# Patient Record
Sex: MALE | Race: WHITE | NOT HISPANIC OR LATINO
[De-identification: names, ages, dates, MRNs, and addresses within clinical notes are randomized per-mention and may not be internally consistent; named-entity substitution may affect disease eponyms.]

---

## 2017-04-03 ENCOUNTER — APPOINTMENT (OUTPATIENT)
Dept: FAMILY MEDICINE | Facility: CLINIC | Age: 62
End: 2017-04-03

## 2017-04-03 VITALS
HEART RATE: 55 BPM | RESPIRATION RATE: 12 BRPM | HEIGHT: 69 IN | DIASTOLIC BLOOD PRESSURE: 74 MMHG | WEIGHT: 232 LBS | BODY MASS INDEX: 34.36 KG/M2 | SYSTOLIC BLOOD PRESSURE: 126 MMHG | OXYGEN SATURATION: 97 %

## 2017-04-10 ENCOUNTER — APPOINTMENT (OUTPATIENT)
Dept: FAMILY MEDICINE | Facility: CLINIC | Age: 62
End: 2017-04-10

## 2017-04-10 VITALS
HEART RATE: 54 BPM | DIASTOLIC BLOOD PRESSURE: 72 MMHG | TEMPERATURE: 98.5 F | HEIGHT: 69 IN | RESPIRATION RATE: 12 BRPM | BODY MASS INDEX: 34.51 KG/M2 | OXYGEN SATURATION: 98 % | SYSTOLIC BLOOD PRESSURE: 120 MMHG | WEIGHT: 233 LBS

## 2017-07-05 ENCOUNTER — APPOINTMENT (OUTPATIENT)
Dept: FAMILY MEDICINE | Facility: CLINIC | Age: 62
End: 2017-07-05

## 2017-07-05 VITALS
DIASTOLIC BLOOD PRESSURE: 76 MMHG | SYSTOLIC BLOOD PRESSURE: 142 MMHG | HEART RATE: 62 BPM | HEIGHT: 69 IN | WEIGHT: 228 LBS | RESPIRATION RATE: 12 BRPM | TEMPERATURE: 98.3 F | OXYGEN SATURATION: 98 % | BODY MASS INDEX: 33.77 KG/M2

## 2017-07-05 RX ORDER — DEXAMETHASONE SODIUM PHOSPHATE 4 MG/ML
4 INJECTION, SOLUTION INTRAMUSCULAR; INTRAVENOUS
Qty: 0 | Refills: 0 | Status: COMPLETED | OUTPATIENT
Start: 2017-07-05

## 2017-07-05 RX ADMIN — Medication 1 MG/ML: at 00:00

## 2017-10-16 ENCOUNTER — APPOINTMENT (OUTPATIENT)
Dept: FAMILY MEDICINE | Facility: CLINIC | Age: 62
End: 2017-10-16
Payer: MEDICAID

## 2017-10-16 VITALS
HEIGHT: 69 IN | SYSTOLIC BLOOD PRESSURE: 132 MMHG | DIASTOLIC BLOOD PRESSURE: 80 MMHG | HEART RATE: 79 BPM | TEMPERATURE: 97.8 F | BODY MASS INDEX: 33.77 KG/M2 | OXYGEN SATURATION: 98 % | RESPIRATION RATE: 12 BRPM | WEIGHT: 228 LBS

## 2017-10-16 PROCEDURE — 20610 DRAIN/INJ JOINT/BURSA W/O US: CPT | Mod: RT

## 2017-10-16 PROCEDURE — 99214 OFFICE O/P EST MOD 30 MIN: CPT | Mod: 25

## 2017-10-16 RX ORDER — MELOXICAM 15 MG/1
15 TABLET ORAL
Qty: 30 | Refills: 0 | Status: COMPLETED | COMMUNITY
Start: 2017-04-17

## 2017-10-16 RX ORDER — BUPIVACAINE HCL/PF 5 MG/ML
0.5 VIAL (ML) INJECTION
Qty: 0 | Refills: 0 | Status: COMPLETED | OUTPATIENT
Start: 2017-10-16

## 2017-10-16 RX ORDER — OXYCODONE AND ACETAMINOPHEN 5; 325 MG/1; MG/1
5-325 TABLET ORAL
Qty: 5 | Refills: 0 | Status: COMPLETED | COMMUNITY
Start: 2017-09-01

## 2017-10-16 RX ORDER — IBUPROFEN 400 MG/1
400 TABLET, FILM COATED ORAL
Qty: 30 | Refills: 0 | Status: COMPLETED | COMMUNITY
Start: 2017-09-01

## 2017-10-16 RX ORDER — DICLOFENAC SODIUM 10 MG/G
1 GEL TOPICAL
Qty: 100 | Refills: 0 | Status: COMPLETED | COMMUNITY
Start: 2017-05-19

## 2017-10-16 RX ORDER — AMOXICILLIN 500 MG/1
500 CAPSULE ORAL
Qty: 21 | Refills: 0 | Status: COMPLETED | COMMUNITY
Start: 2017-06-14

## 2017-10-16 RX ORDER — CHLORHEXIDINE GLUCONATE, 0.12% ORAL RINSE 1.2 MG/ML
0.12 SOLUTION DENTAL
Qty: 473 | Refills: 0 | Status: COMPLETED | COMMUNITY
Start: 2017-06-14

## 2017-10-16 RX ORDER — FLUTICASONE PROPIONATE 50 UG/1
50 SPRAY, METERED NASAL
Qty: 48 | Refills: 0 | Status: COMPLETED | COMMUNITY
Start: 2017-02-02

## 2017-10-16 RX ORDER — ACETAMINOPHEN AND CODEINE 300; 30 MG/1; MG/1
300-30 TABLET ORAL
Qty: 18 | Refills: 0 | Status: COMPLETED | COMMUNITY
Start: 2017-06-14

## 2017-10-16 RX ORDER — BETAMETHA AC,SOD PHOS/WATER/PF 6 MG/ML
6 (3-3) VIAL (ML) INJECTION
Qty: 1 | Refills: 0 | Status: COMPLETED | OUTPATIENT
Start: 2017-10-16

## 2017-10-16 RX ADMIN — BUPIVACAINE HYDROCHLORIDE %: 5 INJECTION, SOLUTION PERINEURAL at 00:00

## 2017-10-16 RX ADMIN — BETAMETHASONE ACETATE AND BETAMETHASONE SODIUM PHOSPHATE 1 MG/ML: 3; 3 INJECTION, SUSPENSION INTRA-ARTICULAR; INTRALESIONAL; INTRAMUSCULAR; SOFT TISSUE at 00:00

## 2017-12-11 ENCOUNTER — APPOINTMENT (OUTPATIENT)
Dept: ORTHOPEDIC SURGERY | Facility: CLINIC | Age: 62
End: 2017-12-11
Payer: MEDICARE

## 2017-12-11 VITALS
WEIGHT: 228 LBS | BODY MASS INDEX: 33.77 KG/M2 | SYSTOLIC BLOOD PRESSURE: 131 MMHG | DIASTOLIC BLOOD PRESSURE: 83 MMHG | HEART RATE: 61 BPM | HEIGHT: 69 IN

## 2017-12-11 PROCEDURE — 99203 OFFICE O/P NEW LOW 30 MIN: CPT

## 2017-12-22 ENCOUNTER — OUTPATIENT (OUTPATIENT)
Dept: OUTPATIENT SERVICES | Facility: HOSPITAL | Age: 62
LOS: 1 days | End: 2017-12-22
Payer: MEDICARE

## 2017-12-22 VITALS — WEIGHT: 231.04 LBS | HEIGHT: 67.5 IN

## 2017-12-22 DIAGNOSIS — M75.121 COMPLETE ROTATOR CUFF TEAR OR RUPTURE OF RIGHT SHOULDER, NOT SPECIFIED AS TRAUMATIC: ICD-10-CM

## 2017-12-22 DIAGNOSIS — J38.1 POLYP OF VOCAL CORD AND LARYNX: Chronic | ICD-10-CM

## 2017-12-22 DIAGNOSIS — Z98.890 OTHER SPECIFIED POSTPROCEDURAL STATES: Chronic | ICD-10-CM

## 2017-12-22 DIAGNOSIS — G47.30 SLEEP APNEA, UNSPECIFIED: ICD-10-CM

## 2017-12-22 DIAGNOSIS — R94.31 ABNORMAL ELECTROCARDIOGRAM [ECG] [EKG]: ICD-10-CM

## 2017-12-22 DIAGNOSIS — M75.100 UNSPECIFIED ROTATOR CUFF TEAR OR RUPTURE OF UNSPECIFIED SHOULDER, NOT SPECIFIED AS TRAUMATIC: ICD-10-CM

## 2017-12-22 DIAGNOSIS — Z96.652 PRESENCE OF LEFT ARTIFICIAL KNEE JOINT: Chronic | ICD-10-CM

## 2017-12-22 LAB
BUN SERPL-MCNC: 17 MG/DL — SIGNIFICANT CHANGE UP (ref 7–23)
CALCIUM SERPL-MCNC: 8.8 MG/DL — SIGNIFICANT CHANGE UP (ref 8.4–10.5)
CHLORIDE SERPL-SCNC: 105 MMOL/L — SIGNIFICANT CHANGE UP (ref 98–107)
CO2 SERPL-SCNC: 23 MMOL/L — SIGNIFICANT CHANGE UP (ref 22–31)
CREAT SERPL-MCNC: 0.94 MG/DL — SIGNIFICANT CHANGE UP (ref 0.5–1.3)
GLUCOSE SERPL-MCNC: 98 MG/DL — SIGNIFICANT CHANGE UP (ref 70–99)
HCT VFR BLD CALC: 47.4 % — SIGNIFICANT CHANGE UP (ref 39–50)
HGB BLD-MCNC: 15.7 G/DL — SIGNIFICANT CHANGE UP (ref 13–17)
MCHC RBC-ENTMCNC: 31 PG — SIGNIFICANT CHANGE UP (ref 27–34)
MCHC RBC-ENTMCNC: 33.1 % — SIGNIFICANT CHANGE UP (ref 32–36)
MCV RBC AUTO: 93.7 FL — SIGNIFICANT CHANGE UP (ref 80–100)
NRBC # FLD: 0 — SIGNIFICANT CHANGE UP
PLATELET # BLD AUTO: 266 K/UL — SIGNIFICANT CHANGE UP (ref 150–400)
PMV BLD: 10.4 FL — SIGNIFICANT CHANGE UP (ref 7–13)
POTASSIUM SERPL-MCNC: 4.3 MMOL/L — SIGNIFICANT CHANGE UP (ref 3.5–5.3)
POTASSIUM SERPL-SCNC: 4.3 MMOL/L — SIGNIFICANT CHANGE UP (ref 3.5–5.3)
RBC # BLD: 5.06 M/UL — SIGNIFICANT CHANGE UP (ref 4.2–5.8)
RBC # FLD: 12.6 % — SIGNIFICANT CHANGE UP (ref 10.3–14.5)
SODIUM SERPL-SCNC: 144 MMOL/L — SIGNIFICANT CHANGE UP (ref 135–145)
WBC # BLD: 9.46 K/UL — SIGNIFICANT CHANGE UP (ref 3.8–10.5)
WBC # FLD AUTO: 9.46 K/UL — SIGNIFICANT CHANGE UP (ref 3.8–10.5)

## 2017-12-22 PROCEDURE — 93010 ELECTROCARDIOGRAM REPORT: CPT

## 2017-12-22 NOTE — H&P PST ADULT - DENTITION
denies loose teeth, one missing teeth, permanent bridge upper and lower denies loose teeth, one missing teeth, permanent bridge upper and lower/normal

## 2017-12-22 NOTE — H&P PST ADULT - PMH
GERD (gastroesophageal reflux disease)    HTN (hypertension)    Hyperlipidemia    Inguinal hernia    Laryngeal polyp    Obesity (BMI 30-39.9)    Osteoarthritis    Sleep apnea  had sleep studies 3 yrs ago - does not use CPAP machine

## 2017-12-22 NOTE — H&P PST ADULT - NEGATIVE GENERAL GENITOURINARY SYMPTOMS
no bladder infections/no urinary hesitancy/no dysuria/no incontinence/no nocturia/normal urinary frequency/no hematuria

## 2017-12-22 NOTE — H&P PST ADULT - PROBLEM SELECTOR PLAN 2
Pt under the care of Cardiologist. Pt going for Cardiac clearance - will obtain copy.  Pt had echo and stress test in 2017 - will obtain copy.  Will also obtain comparison EKG.

## 2017-12-22 NOTE — H&P PST ADULT - PROBLEM SELECTOR PLAN 1
Scheduled for right shoulder arthroscopy, rotator cuff repair, biceps tenodesis on 1/2/17.  Labs pending,   EKG in chart,  Preop instructions provided to pt  Chlorhexidine scrubs provided to pt with instructions.

## 2017-12-22 NOTE — H&P PST ADULT - HISTORY OF PRESENT ILLNESS
62 yrs old male with h/o right shoulder pain for > 2 months which has been getting progressively worse  and so had X rays and MRI and rotator cuff tear was noted . pt presents for preop eval to have 62 yrs old male with h/o right shoulder pain for > 2 months which has been getting progressively worse  and so had X rays and MRI and rotator cuff tear was noted . pt presents for preop eval to have right shoulder arthroscopy, rotator cuff repair, biceps tenodesis on 1/2/17.

## 2017-12-22 NOTE — H&P PST ADULT - NEGATIVE ENMT SYMPTOMS
no hearing difficulty/no post-nasal discharge/no nose bleeds/no dry mouth/no ear pain/no vertigo/no gum bleeding/no recurrent cold sores/no tinnitus

## 2017-12-22 NOTE — H&P PST ADULT - ASSESSMENT
62 yrs old male with h/o right shoulder pain for > 2 months which has been getting progressively worse , pt presents for preop eval to have right shoulder arthroscopy, rotator cuff repair, biceps tenodesis on 1/2/17.

## 2017-12-22 NOTE — H&P PST ADULT - PSH
H/O arthroscopy of left knee  multiple - over the yrs  H/O bilateral inguinal hernia repair  many yrs ago  H/O total knee replacement, left  2010  Laryngeal polyp  removed 25 yrs ago

## 2018-01-02 ENCOUNTER — TRANSCRIPTION ENCOUNTER (OUTPATIENT)
Age: 63
End: 2018-01-02

## 2018-01-02 ENCOUNTER — OUTPATIENT (OUTPATIENT)
Dept: OUTPATIENT SERVICES | Facility: HOSPITAL | Age: 63
LOS: 1 days | Discharge: HOME | End: 2018-01-02
Payer: MEDICARE

## 2018-01-02 ENCOUNTER — APPOINTMENT (OUTPATIENT)
Dept: ORTHOPEDIC SURGERY | Facility: AMBULATORY SURGERY CENTER | Age: 63
End: 2018-01-02

## 2018-01-02 VITALS
RESPIRATION RATE: 16 BRPM | SYSTOLIC BLOOD PRESSURE: 157 MMHG | HEIGHT: 67.5 IN | HEART RATE: 48 BPM | DIASTOLIC BLOOD PRESSURE: 78 MMHG | WEIGHT: 231.04 LBS | TEMPERATURE: 97 F | OXYGEN SATURATION: 99 %

## 2018-01-02 VITALS
RESPIRATION RATE: 18 BRPM | HEART RATE: 65 BPM | OXYGEN SATURATION: 98 % | SYSTOLIC BLOOD PRESSURE: 117 MMHG | DIASTOLIC BLOOD PRESSURE: 61 MMHG

## 2018-01-02 DIAGNOSIS — Z98.890 OTHER SPECIFIED POSTPROCEDURAL STATES: Chronic | ICD-10-CM

## 2018-01-02 DIAGNOSIS — M75.121 COMPLETE ROTATOR CUFF TEAR OR RUPTURE OF RIGHT SHOULDER, NOT SPECIFIED AS TRAUMATIC: ICD-10-CM

## 2018-01-02 DIAGNOSIS — Z96.652 PRESENCE OF LEFT ARTIFICIAL KNEE JOINT: Chronic | ICD-10-CM

## 2018-01-02 DIAGNOSIS — J38.1 POLYP OF VOCAL CORD AND LARYNX: Chronic | ICD-10-CM

## 2018-01-02 PROCEDURE — 23430 REPAIR BICEPS TENDON: CPT | Mod: RT

## 2018-01-02 PROCEDURE — 29827 SHO ARTHRS SRG RT8TR CUF RPR: CPT | Mod: RT

## 2018-01-02 PROCEDURE — 29826 SHO ARTHRS SRG DECOMPRESSION: CPT | Mod: RT

## 2018-01-02 NOTE — ASU DISCHARGE PLAN (ADULT/PEDIATRIC). - ACTIVITY LEVEL
no heavy lifting/no sports/gym/use sling as directed no heavy lifting/no sports/gym/no weight bearing/no tub baths/use sling as directed/elevate extremity

## 2018-01-02 NOTE — ASU DISCHARGE PLAN (ADULT/PEDIATRIC). - SPECIAL INSTRUCTIONS
see Dr. Ochoa's preprinted discharge instruction sheet see Dr. Ochoa's preprinted discharge instruction sheet.... Apply ice for 20 minutes several times per day for the next 24-48 hours, then as needed for comfort. Narcotic pain medication may cause nausea or constipation. Take medication with food. Increase fluids and fiber intake. No creams, lotions, powders  or ointments to incision site. DO NOT TAKE ADDITIONAL TYLENOL WHILE TAKING PRESCRIPTION PAIN MEDS THEY CONTAIN TYLENOL....

## 2018-01-02 NOTE — BRIEF OPERATIVE NOTE - PROCEDURE
<<-----Click on this checkbox to enter Procedure Arthroscopic repair of right rotator cuff  01/02/2018  R shoulder arthrscopy, high grade partial supraspinatous repair, subscap repair, open subpec bicep tenodesis,  Active  SSTEIN2

## 2018-01-02 NOTE — ASU DISCHARGE PLAN (ADULT/PEDIATRIC). - NOTIFY
Swelling that continues/Persistent Nausea and Vomiting/Fever greater than 101/Bleeding that does not stop/Pain not relieved by Medications/Numbness, color, or temperature change to extremity Persistent Nausea and Vomiting/Pain not relieved by Medications/Fever greater than 101/Increased Irritability or Sluggishness/Inability to Tolerate Liquids or Foods/Bleeding that does not stop/Swelling that continues/Unable to Urinate/Numbness, color, or temperature change to extremity

## 2018-01-12 ENCOUNTER — APPOINTMENT (OUTPATIENT)
Dept: ORTHOPEDIC SURGERY | Facility: CLINIC | Age: 63
End: 2018-01-12
Payer: MEDICARE

## 2018-01-12 PROCEDURE — 99024 POSTOP FOLLOW-UP VISIT: CPT

## 2018-01-30 ENCOUNTER — TRANSCRIPTION ENCOUNTER (OUTPATIENT)
Age: 63
End: 2018-01-30

## 2018-02-07 ENCOUNTER — APPOINTMENT (OUTPATIENT)
Dept: ORTHOPEDIC SURGERY | Facility: CLINIC | Age: 63
End: 2018-02-07
Payer: MEDICARE

## 2018-02-07 PROCEDURE — 99024 POSTOP FOLLOW-UP VISIT: CPT

## 2018-02-07 PROCEDURE — 73030 X-RAY EXAM OF SHOULDER: CPT | Mod: RT

## 2018-03-22 ENCOUNTER — APPOINTMENT (OUTPATIENT)
Dept: ORTHOPEDIC SURGERY | Facility: CLINIC | Age: 63
End: 2018-03-22
Payer: MEDICARE

## 2018-03-22 PROCEDURE — 99024 POSTOP FOLLOW-UP VISIT: CPT

## 2018-05-07 ENCOUNTER — APPOINTMENT (OUTPATIENT)
Dept: ORTHOPEDIC SURGERY | Facility: CLINIC | Age: 63
End: 2018-05-07
Payer: MEDICARE

## 2018-05-07 PROCEDURE — 99213 OFFICE O/P EST LOW 20 MIN: CPT

## 2018-09-25 ENCOUNTER — APPOINTMENT (OUTPATIENT)
Dept: FAMILY MEDICINE | Facility: CLINIC | Age: 63
End: 2018-09-25
Payer: MEDICARE

## 2018-09-25 VITALS
RESPIRATION RATE: 13 BRPM | OXYGEN SATURATION: 98 % | HEART RATE: 71 BPM | WEIGHT: 230 LBS | HEIGHT: 69 IN | DIASTOLIC BLOOD PRESSURE: 80 MMHG | BODY MASS INDEX: 34.07 KG/M2 | SYSTOLIC BLOOD PRESSURE: 122 MMHG | TEMPERATURE: 98.2 F

## 2018-09-25 DIAGNOSIS — M54.2 CERVICALGIA: ICD-10-CM

## 2018-09-25 DIAGNOSIS — Z87.898 PERSONAL HISTORY OF OTHER SPECIFIED CONDITIONS: ICD-10-CM

## 2018-09-25 DIAGNOSIS — M43.6 TORTICOLLIS: ICD-10-CM

## 2018-09-25 DIAGNOSIS — Z86.39 PERSONAL HISTORY OF OTHER ENDOCRINE, NUTRITIONAL AND METABOLIC DISEASE: ICD-10-CM

## 2018-09-25 DIAGNOSIS — Z86.79 PERSONAL HISTORY OF OTHER DISEASES OF THE CIRCULATORY SYSTEM: ICD-10-CM

## 2018-09-25 PROBLEM — K21.9 GASTRO-ESOPHAGEAL REFLUX DISEASE WITHOUT ESOPHAGITIS: Chronic | Status: ACTIVE | Noted: 2017-12-22

## 2018-09-25 PROBLEM — I10 ESSENTIAL (PRIMARY) HYPERTENSION: Chronic | Status: ACTIVE | Noted: 2017-12-22

## 2018-09-25 PROBLEM — J38.1 POLYP OF VOCAL CORD AND LARYNX: Chronic | Status: ACTIVE | Noted: 2017-12-22

## 2018-09-25 PROBLEM — E78.5 HYPERLIPIDEMIA, UNSPECIFIED: Chronic | Status: ACTIVE | Noted: 2017-12-22

## 2018-09-25 PROBLEM — K40.90 UNILATERAL INGUINAL HERNIA, WITHOUT OBSTRUCTION OR GANGRENE, NOT SPECIFIED AS RECURRENT: Chronic | Status: ACTIVE | Noted: 2017-12-22

## 2018-09-25 PROBLEM — M19.90 UNSPECIFIED OSTEOARTHRITIS, UNSPECIFIED SITE: Chronic | Status: ACTIVE | Noted: 2017-12-22

## 2018-09-25 PROBLEM — E66.9 OBESITY, UNSPECIFIED: Chronic | Status: ACTIVE | Noted: 2017-12-22

## 2018-09-25 PROCEDURE — 99214 OFFICE O/P EST MOD 30 MIN: CPT

## 2018-09-25 NOTE — REVIEW OF SYSTEMS
[Patient Intake Form Reviewed] : Patient intake form was reviewed [Fatigue] : fatigue [Negative] : Heme/Lymph [FreeTextEntry2] : Overweight

## 2018-09-25 NOTE — ASSESSMENT
[FreeTextEntry1] : Cervicalgia/Torticollis - Duexis 800/26.6 mg + Tylenol 325 mg. TID with food.  PT Referral.

## 2018-12-26 ENCOUNTER — APPOINTMENT (OUTPATIENT)
Dept: FAMILY MEDICINE | Facility: CLINIC | Age: 63
End: 2018-12-26
Payer: MEDICARE

## 2018-12-26 VITALS
DIASTOLIC BLOOD PRESSURE: 76 MMHG | TEMPERATURE: 98.7 F | SYSTOLIC BLOOD PRESSURE: 130 MMHG | RESPIRATION RATE: 12 BRPM | HEART RATE: 47 BPM | OXYGEN SATURATION: 95 % | WEIGHT: 230 LBS | BODY MASS INDEX: 33.97 KG/M2

## 2018-12-26 DIAGNOSIS — Z86.39 PERSONAL HISTORY OF OTHER ENDOCRINE, NUTRITIONAL AND METABOLIC DISEASE: ICD-10-CM

## 2018-12-26 PROCEDURE — 99214 OFFICE O/P EST MOD 30 MIN: CPT

## 2018-12-26 NOTE — REVIEW OF SYSTEMS
[Fatigue] : fatigue [Nasal Discharge] : nasal discharge [Sore Throat] : sore throat [Cough] : cough [Muscle Pain] : muscle pain [Negative] : Heme/Lymph

## 2018-12-26 NOTE — HISTORY OF PRESENT ILLNESS
[FreeTextEntry8] : Patient presents with productive cough , congestion and head ache x few days.\par Diet is good and drinking water daily. Compliant with medications

## 2018-12-26 NOTE — PLAN
[FreeTextEntry1] : Advised to continue medications as directed. Life style and diet modifications were reviewed.\par Will start antibiotic therapy. Supportive care was discussed

## 2018-12-26 NOTE — COUNSELING
[Weight management counseling provided] : Weight management [Healthy eating counseling provided] : healthy eating [Behavioral health counseling provided] : behavioral health  [None] : None

## 2018-12-27 RX ORDER — AMOXICILLIN AND CLAVULANATE POTASSIUM 875; 125 MG/1; MG/1
875-125 TABLET, COATED ORAL
Qty: 20 | Refills: 0 | Status: DISCONTINUED | COMMUNITY
Start: 2018-12-26 | End: 2018-12-27

## 2019-01-07 ENCOUNTER — APPOINTMENT (OUTPATIENT)
Dept: FAMILY MEDICINE | Facility: CLINIC | Age: 64
End: 2019-01-07
Payer: MEDICARE

## 2019-01-07 VITALS
DIASTOLIC BLOOD PRESSURE: 74 MMHG | RESPIRATION RATE: 12 BRPM | OXYGEN SATURATION: 98 % | SYSTOLIC BLOOD PRESSURE: 128 MMHG | BODY MASS INDEX: 34.07 KG/M2 | TEMPERATURE: 98 F | HEART RATE: 55 BPM | WEIGHT: 230 LBS | HEIGHT: 69 IN

## 2019-01-07 PROCEDURE — 99213 OFFICE O/P EST LOW 20 MIN: CPT

## 2019-01-07 RX ORDER — METHYLPREDNISOLONE 4 MG/1
4 TABLET ORAL
Qty: 1 | Refills: 0 | Status: COMPLETED | COMMUNITY
Start: 2018-12-26 | End: 2019-01-07

## 2019-02-25 ENCOUNTER — LABORATORY RESULT (OUTPATIENT)
Age: 64
End: 2019-02-25

## 2019-02-25 ENCOUNTER — APPOINTMENT (OUTPATIENT)
Dept: FAMILY MEDICINE | Facility: CLINIC | Age: 64
End: 2019-02-25
Payer: MEDICARE

## 2019-02-25 VITALS
BODY MASS INDEX: 34.07 KG/M2 | DIASTOLIC BLOOD PRESSURE: 72 MMHG | HEART RATE: 80 BPM | WEIGHT: 230 LBS | HEIGHT: 69 IN | RESPIRATION RATE: 13 BRPM | SYSTOLIC BLOOD PRESSURE: 126 MMHG | OXYGEN SATURATION: 98 %

## 2019-02-25 DIAGNOSIS — S43.81XD SPRAIN OF OTHER SPECIFIED PARTS OF RIGHT SHOULDER GIRDLE, SUBSEQUENT ENCOUNTER: ICD-10-CM

## 2019-02-25 DIAGNOSIS — J06.9 ACUTE UPPER RESPIRATORY INFECTION, UNSPECIFIED: ICD-10-CM

## 2019-02-25 DIAGNOSIS — Z87.828 PERSONAL HISTORY OF OTHER (HEALED) PHYSICAL INJURY AND TRAUMA: ICD-10-CM

## 2019-02-25 DIAGNOSIS — Z87.898 PERSONAL HISTORY OF OTHER SPECIFIED CONDITIONS: ICD-10-CM

## 2019-02-25 DIAGNOSIS — L25.5 UNSPECIFIED CONTACT DERMATITIS DUE TO PLANTS, EXCEPT FOOD: ICD-10-CM

## 2019-02-25 DIAGNOSIS — M79.672 PAIN IN LEFT FOOT: ICD-10-CM

## 2019-02-25 DIAGNOSIS — Z87.820 PERSONAL HISTORY OF TRAUMATIC BRAIN INJURY: ICD-10-CM

## 2019-02-25 DIAGNOSIS — M75.81 OTHER SHOULDER LESIONS, RIGHT SHOULDER: ICD-10-CM

## 2019-02-25 DIAGNOSIS — M67.911 UNSPECIFIED DISORDER OF SYNOVIUM AND TENDON, RIGHT SHOULDER: ICD-10-CM

## 2019-02-25 DIAGNOSIS — Z87.09 PERSONAL HISTORY OF OTHER DISEASES OF THE RESPIRATORY SYSTEM: ICD-10-CM

## 2019-02-25 DIAGNOSIS — S46.119A STRAIN OF MUSCLE, FASCIA AND TENDON OF LONG HEAD OF BICEPS, UNSPECIFIED ARM, INITIAL ENCOUNTER: ICD-10-CM

## 2019-02-25 DIAGNOSIS — M77.32 CALCANEAL SPUR, LEFT FOOT: ICD-10-CM

## 2019-02-25 PROCEDURE — 36415 COLL VENOUS BLD VENIPUNCTURE: CPT

## 2019-02-25 PROCEDURE — 99215 OFFICE O/P EST HI 40 MIN: CPT | Mod: 25

## 2019-02-25 PROCEDURE — 93000 ELECTROCARDIOGRAM COMPLETE: CPT | Mod: 59

## 2019-02-25 RX ORDER — OSELTAMIVIR PHOSPHATE 75 MG/1
75 CAPSULE ORAL TWICE DAILY
Qty: 10 | Refills: 1 | Status: COMPLETED | COMMUNITY
Start: 2019-01-07 | End: 2019-02-25

## 2019-02-25 RX ORDER — AZITHROMYCIN 250 MG/1
250 TABLET, FILM COATED ORAL
Qty: 1 | Refills: 1 | Status: COMPLETED | COMMUNITY
Start: 2018-12-27 | End: 2019-02-25

## 2019-02-25 RX ORDER — PROMETHAZINE HYDROCHLORIDE AND DEXTROMETHORPHAN HYDROBROMIDE ORAL SOLUTION 15; 6.25 MG/5ML; MG/5ML
6.25-15 SOLUTION ORAL
Qty: 240 | Refills: 1 | Status: COMPLETED | COMMUNITY
Start: 2019-01-07 | End: 2019-02-25

## 2019-02-26 LAB
ALBUMIN SERPL ELPH-MCNC: 4.5 G/DL
ALP BLD-CCNC: 82 U/L
ALT SERPL-CCNC: 20 U/L
ANION GAP SERPL CALC-SCNC: 19 MMOL/L
APPEARANCE: ABNORMAL
APTT BLD: 36.4 SEC
AST SERPL-CCNC: 19 U/L
BASOPHILS # BLD AUTO: 0.08 K/UL
BASOPHILS NFR BLD AUTO: 0.7 %
BILIRUB SERPL-MCNC: 0.5 MG/DL
BILIRUBIN URINE: NEGATIVE
BLOOD URINE: NEGATIVE
BUN SERPL-MCNC: 16 MG/DL
CALCIUM SERPL-MCNC: 9.1 MG/DL
CHLORIDE SERPL-SCNC: 103 MMOL/L
CO2 SERPL-SCNC: 20 MMOL/L
COLOR: YELLOW
CREAT SERPL-MCNC: 0.89 MG/DL
EOSINOPHIL # BLD AUTO: 0.17 K/UL
EOSINOPHIL NFR BLD AUTO: 1.5 %
GLUCOSE QUALITATIVE U: NEGATIVE
GLUCOSE SERPL-MCNC: 98 MG/DL
HCT VFR BLD CALC: 45.1 %
HGB BLD-MCNC: 14.2 G/DL
IMM GRANULOCYTES NFR BLD AUTO: 0.3 %
INR PPP: 1.07 RATIO
KETONES URINE: NEGATIVE
LEUKOCYTE ESTERASE URINE: NEGATIVE
LYMPHOCYTES # BLD AUTO: 1.42 K/UL
LYMPHOCYTES NFR BLD AUTO: 12.3 %
MAN DIFF?: NORMAL
MCHC RBC-ENTMCNC: 31.4 PG
MCHC RBC-ENTMCNC: 31.5 GM/DL
MCV RBC AUTO: 99.8 FL
MONOCYTES # BLD AUTO: 1.12 K/UL
MONOCYTES NFR BLD AUTO: 9.7 %
NEUTROPHILS # BLD AUTO: 8.71 K/UL
NEUTROPHILS NFR BLD AUTO: 75.5 %
NITRITE URINE: NEGATIVE
PH URINE: 5.5
PLATELET # BLD AUTO: 292 K/UL
POTASSIUM SERPL-SCNC: 4.5 MMOL/L
PROT SERPL-MCNC: 6.7 G/DL
PROTEIN URINE: NORMAL
PT BLD: 12.3 SEC
RBC # BLD: 4.52 M/UL
RBC # FLD: 13.3 %
SODIUM SERPL-SCNC: 142 MMOL/L
SPECIFIC GRAVITY URINE: 1.03
UROBILINOGEN URINE: NORMAL
WBC # FLD AUTO: 11.54 K/UL

## 2019-05-15 ENCOUNTER — TRANSCRIPTION ENCOUNTER (OUTPATIENT)
Age: 64
End: 2019-05-15

## 2019-08-02 NOTE — H&P PST ADULT - NS ABD PE RECTAL EXAM
RN consulted Dr. Kurt Cabrales for GI consult regarding acites and dysphagia. RN requested a callback to [address] [facility] [address],[facility] for days at [phone]. patient refused

## 2019-12-23 ENCOUNTER — APPOINTMENT (OUTPATIENT)
Dept: FAMILY MEDICINE | Facility: CLINIC | Age: 64
End: 2019-12-23
Payer: MEDICARE

## 2019-12-23 VITALS
WEIGHT: 230 LBS | OXYGEN SATURATION: 98 % | BODY MASS INDEX: 34.07 KG/M2 | DIASTOLIC BLOOD PRESSURE: 78 MMHG | TEMPERATURE: 98.1 F | SYSTOLIC BLOOD PRESSURE: 128 MMHG | RESPIRATION RATE: 13 BRPM | HEART RATE: 78 BPM | HEIGHT: 69 IN

## 2019-12-23 PROCEDURE — 99214 OFFICE O/P EST MOD 30 MIN: CPT

## 2019-12-24 ENCOUNTER — TRANSCRIPTION ENCOUNTER (OUTPATIENT)
Age: 64
End: 2019-12-24

## 2020-06-19 RX ORDER — PROMETHAZINE HYDROCHLORIDE AND DEXTROMETHORPHAN HYDROBROMIDE ORAL SOLUTION 15; 6.25 MG/5ML; MG/5ML
6.25-15 SOLUTION ORAL
Qty: 240 | Refills: 1 | Status: COMPLETED | COMMUNITY
Start: 2019-12-23 | End: 2020-06-19

## 2020-06-19 RX ORDER — AMOXICILLIN AND CLAVULANATE POTASSIUM 875; 125 MG/1; MG/1
875-125 TABLET, COATED ORAL
Qty: 14 | Refills: 1 | Status: COMPLETED | COMMUNITY
Start: 2019-12-23 | End: 2020-06-19

## 2020-06-25 ENCOUNTER — APPOINTMENT (OUTPATIENT)
Dept: FAMILY MEDICINE | Facility: CLINIC | Age: 65
End: 2020-06-25
Payer: MEDICARE

## 2020-06-25 VITALS
WEIGHT: 230 LBS | BODY MASS INDEX: 34.07 KG/M2 | SYSTOLIC BLOOD PRESSURE: 128 MMHG | HEIGHT: 69 IN | OXYGEN SATURATION: 98 % | HEART RATE: 77 BPM | DIASTOLIC BLOOD PRESSURE: 78 MMHG | RESPIRATION RATE: 13 BRPM

## 2020-06-25 DIAGNOSIS — S99.919A UNSPECIFIED INJURY OF UNSPECIFIED ANKLE, INITIAL ENCOUNTER: ICD-10-CM

## 2020-06-25 DIAGNOSIS — Z11.59 ENCOUNTER FOR SCREENING FOR OTHER VIRAL DISEASES: ICD-10-CM

## 2020-06-25 DIAGNOSIS — Z01.818 ENCOUNTER FOR OTHER PREPROCEDURAL EXAMINATION: ICD-10-CM

## 2020-06-25 DIAGNOSIS — Z11.4 ENCOUNTER FOR SCREENING FOR HUMAN IMMUNODEFICIENCY VIRUS [HIV]: ICD-10-CM

## 2020-06-25 DIAGNOSIS — J06.9 ACUTE UPPER RESPIRATORY INFECTION, UNSPECIFIED: ICD-10-CM

## 2020-06-25 DIAGNOSIS — Z87.81 PERSONAL HISTORY OF (HEALED) TRAUMATIC FRACTURE: ICD-10-CM

## 2020-06-25 PROCEDURE — 36415 COLL VENOUS BLD VENIPUNCTURE: CPT

## 2020-06-25 PROCEDURE — 99214 OFFICE O/P EST MOD 30 MIN: CPT | Mod: 25

## 2020-07-02 LAB
25(OH)D3 SERPL-MCNC: 31.9 NG/ML
ALBUMIN SERPL ELPH-MCNC: 4.5 G/DL
ALP BLD-CCNC: 93 U/L
ALT SERPL-CCNC: 22 U/L
ANION GAP SERPL CALC-SCNC: 15 MMOL/L
AST SERPL-CCNC: 20 U/L
BASOPHILS # BLD AUTO: 0.09 K/UL
BASOPHILS NFR BLD AUTO: 1.1 %
BILIRUB SERPL-MCNC: 0.5 MG/DL
BUN SERPL-MCNC: 14 MG/DL
CALCIUM SERPL-MCNC: 9.2 MG/DL
CHLORIDE SERPL-SCNC: 107 MMOL/L
CHOLEST SERPL-MCNC: 159 MG/DL
CHOLEST/HDLC SERPL: 3.3 RATIO
CO2 SERPL-SCNC: 19 MMOL/L
CREAT SERPL-MCNC: 0.85 MG/DL
CREAT SPEC-SCNC: 101 MG/DL
EOSINOPHIL # BLD AUTO: 0.13 K/UL
EOSINOPHIL NFR BLD AUTO: 1.6 %
ESTIMATED AVERAGE GLUCOSE: 111 MG/DL
FERRITIN SERPL-MCNC: 504 NG/ML
FOLATE SERPL-MCNC: >20 NG/ML
GLUCOSE SERPL-MCNC: 110 MG/DL
HBA1C MFR BLD HPLC: 5.5 %
HCT VFR BLD CALC: 49.8 %
HCV RNA SERPL NAA DL=5-ACNC: NOT DETECTED IU/ML
HCV RNA SERPL NAA+PROBE-LOG IU: NOT DETECTED LOG10IU/ML
HDLC SERPL-MCNC: 49 MG/DL
HGB BLD-MCNC: 15.9 G/DL
HIV1+2 AB SPEC QL IA.RAPID: NONREACTIVE
IMM GRANULOCYTES NFR BLD AUTO: 0.4 %
IRON SATN MFR SERPL: 32 %
IRON SERPL-MCNC: 89 UG/DL
LDLC SERPL CALC-MCNC: 78 MG/DL
LYMPHOCYTES # BLD AUTO: 1.45 K/UL
LYMPHOCYTES NFR BLD AUTO: 17.4 %
MAN DIFF?: NORMAL
MCHC RBC-ENTMCNC: 31.9 GM/DL
MCHC RBC-ENTMCNC: 32.1 PG
MCV RBC AUTO: 100.4 FL
MICROALBUMIN 24H UR DL<=1MG/L-MCNC: <1.2 MG/DL
MICROALBUMIN/CREAT 24H UR-RTO: NORMAL MG/G
MONOCYTES # BLD AUTO: 0.91 K/UL
MONOCYTES NFR BLD AUTO: 11 %
NEUTROPHILS # BLD AUTO: 5.7 K/UL
NEUTROPHILS NFR BLD AUTO: 68.5 %
PLATELET # BLD AUTO: 272 K/UL
POTASSIUM SERPL-SCNC: 4.3 MMOL/L
PROT SERPL-MCNC: 6.8 G/DL
PSA SERPL-MCNC: 1.02 NG/ML
RBC # BLD: 4.96 M/UL
RBC # FLD: 13.8 %
SARS-COV-2 IGG SERPL IA-ACNC: 0.01 INDEX
SARS-COV-2 IGG SERPL QL IA: NEGATIVE
SODIUM SERPL-SCNC: 142 MMOL/L
T4 SERPL-MCNC: 6.7 UG/DL
TIBC SERPL-MCNC: 281 UG/DL
TRIGL SERPL-MCNC: 165 MG/DL
TSH SERPL-ACNC: 2.15 UIU/ML
UIBC SERPL-MCNC: 192 UG/DL
VIT B12 SERPL-MCNC: 926 PG/ML
WBC # FLD AUTO: 8.31 K/UL

## 2020-07-16 ENCOUNTER — APPOINTMENT (OUTPATIENT)
Dept: FAMILY MEDICINE | Facility: CLINIC | Age: 65
End: 2020-07-16
Payer: MEDICARE

## 2020-07-16 VITALS
HEART RATE: 49 BPM | BODY MASS INDEX: 34.07 KG/M2 | DIASTOLIC BLOOD PRESSURE: 80 MMHG | SYSTOLIC BLOOD PRESSURE: 110 MMHG | OXYGEN SATURATION: 97 % | RESPIRATION RATE: 14 BRPM | WEIGHT: 230 LBS | HEIGHT: 69 IN

## 2020-07-16 PROCEDURE — 99214 OFFICE O/P EST MOD 30 MIN: CPT

## 2020-12-08 ENCOUNTER — APPOINTMENT (OUTPATIENT)
Dept: FAMILY MEDICINE | Facility: CLINIC | Age: 65
End: 2020-12-08
Payer: MEDICARE

## 2020-12-08 VITALS
WEIGHT: 225 LBS | HEART RATE: 68 BPM | OXYGEN SATURATION: 98 % | SYSTOLIC BLOOD PRESSURE: 124 MMHG | DIASTOLIC BLOOD PRESSURE: 68 MMHG | RESPIRATION RATE: 14 BRPM | BODY MASS INDEX: 33.33 KG/M2 | TEMPERATURE: 98.3 F | HEIGHT: 69 IN

## 2020-12-08 DIAGNOSIS — M25.512 PAIN IN LEFT SHOULDER: ICD-10-CM

## 2020-12-08 DIAGNOSIS — R20.0 ANESTHESIA OF SKIN: ICD-10-CM

## 2020-12-08 PROCEDURE — 99214 OFFICE O/P EST MOD 30 MIN: CPT

## 2020-12-08 PROCEDURE — 99072 ADDL SUPL MATRL&STAF TM PHE: CPT

## 2020-12-08 RX ORDER — ACETAMINOPHEN AND CODEINE PHOSPHATE 300; 15 MG/1; MG/1
300-15 TABLET ORAL
Qty: 12 | Refills: 0 | Status: COMPLETED | COMMUNITY
Start: 2020-07-08

## 2020-12-14 ENCOUNTER — APPOINTMENT (OUTPATIENT)
Dept: RADIOLOGY | Facility: CLINIC | Age: 65
End: 2020-12-14
Payer: MEDICARE

## 2020-12-14 ENCOUNTER — OUTPATIENT (OUTPATIENT)
Dept: OUTPATIENT SERVICES | Facility: HOSPITAL | Age: 65
LOS: 1 days | End: 2020-12-14
Payer: COMMERCIAL

## 2020-12-14 ENCOUNTER — APPOINTMENT (OUTPATIENT)
Dept: MRI IMAGING | Facility: CLINIC | Age: 65
End: 2020-12-14
Payer: MEDICARE

## 2020-12-14 DIAGNOSIS — M24.812 OTHER SPECIFIC JOINT DERANGEMENTS OF LEFT SHOULDER, NOT ELSEWHERE CLASSIFIED: ICD-10-CM

## 2020-12-14 DIAGNOSIS — J38.1 POLYP OF VOCAL CORD AND LARYNX: Chronic | ICD-10-CM

## 2020-12-14 DIAGNOSIS — Z98.890 OTHER SPECIFIED POSTPROCEDURAL STATES: Chronic | ICD-10-CM

## 2020-12-14 DIAGNOSIS — Z96.652 PRESENCE OF LEFT ARTIFICIAL KNEE JOINT: Chronic | ICD-10-CM

## 2020-12-14 DIAGNOSIS — M25.512 PAIN IN LEFT SHOULDER: ICD-10-CM

## 2020-12-14 PROCEDURE — 73030 X-RAY EXAM OF SHOULDER: CPT | Mod: 26,LT

## 2020-12-14 PROCEDURE — 73030 X-RAY EXAM OF SHOULDER: CPT

## 2020-12-14 PROCEDURE — 73221 MRI JOINT UPR EXTREM W/O DYE: CPT | Mod: 26,LT

## 2020-12-14 PROCEDURE — 73221 MRI JOINT UPR EXTREM W/O DYE: CPT

## 2020-12-28 ENCOUNTER — APPOINTMENT (OUTPATIENT)
Dept: ORTHOPEDIC SURGERY | Facility: CLINIC | Age: 65
End: 2020-12-28
Payer: MEDICARE

## 2020-12-28 DIAGNOSIS — S43.82XA SPRAIN OF OTHER SPECIFIED PARTS OF LEFT SHOULDER GIRDLE, INITIAL ENCOUNTER: ICD-10-CM

## 2020-12-28 PROCEDURE — 99214 OFFICE O/P EST MOD 30 MIN: CPT

## 2020-12-28 PROCEDURE — 99072 ADDL SUPL MATRL&STAF TM PHE: CPT

## 2020-12-28 NOTE — DISCUSSION/SUMMARY
[Medication Risks Reviewed] : Medication risks reviewed [Surgical risks reviewed] : Surgical risks reviewed [de-identified] : Discussion had with the patient. He has persistent pain. The patient was prescribed Vicodin by the primary care physician which has not helped. He notes an allergy to Aleve but tolerates ibuprofen. He will try and supplement with Motrin as needed for pain relief. Surgical management of his shoulder is indicated. He had similar pathology on the right shoulder and did well with his decompression, repair and biceps tendon tenodesis. i advised on low stress views of the upper extremity for activities of daily living at this time.He will work with the office regarding scheduling.

## 2020-12-28 NOTE — HISTORY OF PRESENT ILLNESS
[de-identified] : Mr. MAY KLEIN is a 65 year male who presents to office complaining of left shoulder pain x 2 months with insidious onset.\par He denies history of injury, trauma, or fall to his left shoulder.\par He has a constant baseline dull/achy pain that becomes more notable and sharp when he does overhead lifting/activities.\par Pain is located in the anterior aspect of his shoulder.\par Denies instability of the shoulder or any numbness/tingling in his arms.\par He has not performed conservative treatment thus far for his left shoulder pain.\par H/o right RTC repair in 2018, which is doing very well, at this time.\par All review of systems, family history, social history, surgical history, past medical history, medications, and allergies not previously stated as positive are negative. They were reviewed by me today with the patient and documented accordingly.

## 2020-12-28 NOTE — PHYSICAL EXAM
[UE] : Sensory: Intact in bilateral upper extremities [DP] : dorsalis pedis 2+ and symmetric bilaterally [Normal RUE] : Right Upper Extremity: No scars, rashes, lesions, ulcers, skin intact [Normal LUE] : Left Upper Extremity: No scars, rashes, lesions, ulcers, skin intact [Normal] : Oriented to person, place, and time, insight and judgement were intact and the affect was normal [Pain Left Shoulder Active Forw Flexion Against Resistance] : negative Empty Can test [Shoulder Pain During Ocampo-Arthur Impingement Test Left] : negative Ocampo test [Shoulder Motion On Internal Rotation] : negative Lift Off test [Shoulder Pain Elicited During Clermont's Test Left] : negative Hicks's test [Active Abduction Left Shoulder Decreased] : negative Painful Arc [Shoulder Motion On Adduction] : negative AC provocation [Shoulder Muscle Weakness Supraspinatus Left Only] : negative Drop Arm test [de-identified] : Healed right shoulder surgical skin incisions.Pain free active range of motion right shoulder. Left shoulder: Skin intact. Tender over the greater tuberosity/bicipital groove. Painful active motion left shoulder. Left shoulder active motion-150° forward elevation, 90° abduction, internal rotation and to lower lumbar region. Pain/weakness with resisted forward elevation. Pain/weakness noted with belly press testing. [de-identified] : EXAM: MR SHOULDER LT\par \par PROCEDURE DATE: 12/14/2020\par \par INTERPRETATION: LEFT SHOULDER MRI\par \par CLINICAL INFORMATION: Shoulder pain\par TECHNIQUE: Multiplanar, multisequence MR imaging was obtained of the left shoulder.\par COMPARISON: Shoulder radiographs dated 12/14/2020\par \par FINDINGS:\par \par ROTATOR CUFF: There is moderate thickening and increased signal throughout the supraspinatus and infraspinatus tendons. Small mid/posterior infraspinatus bursal sided perforation with ganglion cyst extending into the intrasubstance of the middle infraspinatus. There is a high-grade partial tear involving the bursal sided fibers of the subscapularis. There is mild retraction of the torn tendon fibers. The teres minor is preserved.\par BICEPS TENDON: There is dislocation of the biceps tendon from the groove medially below the subscapularis tendon. There is increased signal within the biceps tendon suggesting tendinosis.\par AC JOINT: Moderate to severe productive change at the acromioclavicular joint with moderate undersurface spurring effacing the subacromial/subdeltoid fat and contacting the supraspinatus myotendinous junction.\par GLENOID LABRUM AND GLENOHUMERAL LIGAMENTS: Blunting of the posterior, superior labrum. Inferior glenohumeral ligament is intact.\par GLENOHUMERAL CARTILAGE AND SUBCHONDRAL BONE: Thinning of the glenohumeral cartilage. No full-thickness cartilage loss or subchondral marrow edema.\par SYNOVIUM/JOINT FLUID: Moderate glenohumeral joint effusion with synovitis.\par BONE MARROW: There is no fracture. No osteonecrosis.\par MUSCLES: No muscle atrophy or muscle edema\par NEUROVASCULAR STRUCTURES: Preserved\par SUBCUTANEOUS SOFT TISSUES: No soft tissue swelling.\par \par IMPRESSION:\par \par 1. High-grade partial tear of the articular surface fibers of the subscapularis with retraction.\par 2. Medial dislocation of the biceps tendon onto the subscapularis. Intra-articular biceps tendinosis.\par 3. Supraspinatus and infraspinatus tendinosis. Bursal sided perforation within the infraspinatus with intrasubstance ganglion.\par 4. Severe arthrosis at the acromioclavicular joint.\par \par LYRIC MILILLO MD; Attending Radiologist\par This document has been electronically signed. Dec 14 2020 4:45PM

## 2021-01-08 ENCOUNTER — OUTPATIENT (OUTPATIENT)
Dept: OUTPATIENT SERVICES | Facility: HOSPITAL | Age: 66
LOS: 1 days | End: 2021-01-08
Payer: MEDICARE

## 2021-01-08 VITALS
TEMPERATURE: 97 F | HEIGHT: 68 IN | DIASTOLIC BLOOD PRESSURE: 84 MMHG | WEIGHT: 246.04 LBS | RESPIRATION RATE: 16 BRPM | SYSTOLIC BLOOD PRESSURE: 128 MMHG | HEART RATE: 47 BPM | OXYGEN SATURATION: 98 %

## 2021-01-08 DIAGNOSIS — J38.1 POLYP OF VOCAL CORD AND LARYNX: Chronic | ICD-10-CM

## 2021-01-08 DIAGNOSIS — Z96.652 PRESENCE OF LEFT ARTIFICIAL KNEE JOINT: Chronic | ICD-10-CM

## 2021-01-08 DIAGNOSIS — Z98.890 OTHER SPECIFIED POSTPROCEDURAL STATES: Chronic | ICD-10-CM

## 2021-01-08 DIAGNOSIS — R94.31 ABNORMAL ELECTROCARDIOGRAM [ECG] [EKG]: ICD-10-CM

## 2021-01-08 DIAGNOSIS — M67.912 UNSPECIFIED DISORDER OF SYNOVIUM AND TENDON, LEFT SHOULDER: ICD-10-CM

## 2021-01-08 DIAGNOSIS — G47.33 OBSTRUCTIVE SLEEP APNEA (ADULT) (PEDIATRIC): ICD-10-CM

## 2021-01-08 LAB
ANION GAP SERPL CALC-SCNC: 12 MMOL/L — SIGNIFICANT CHANGE UP (ref 7–14)
BUN SERPL-MCNC: 17 MG/DL — SIGNIFICANT CHANGE UP (ref 7–23)
CALCIUM SERPL-MCNC: 9.2 MG/DL — SIGNIFICANT CHANGE UP (ref 8.4–10.5)
CHLORIDE SERPL-SCNC: 106 MMOL/L — SIGNIFICANT CHANGE UP (ref 98–107)
CO2 SERPL-SCNC: 23 MMOL/L — SIGNIFICANT CHANGE UP (ref 22–31)
CREAT SERPL-MCNC: 0.84 MG/DL — SIGNIFICANT CHANGE UP (ref 0.5–1.3)
GLUCOSE SERPL-MCNC: 107 MG/DL — HIGH (ref 70–99)
HCT VFR BLD CALC: 46 % — SIGNIFICANT CHANGE UP (ref 39–50)
HGB BLD-MCNC: 14.9 G/DL — SIGNIFICANT CHANGE UP (ref 13–17)
MCHC RBC-ENTMCNC: 30.8 PG — SIGNIFICANT CHANGE UP (ref 27–34)
MCHC RBC-ENTMCNC: 32.4 GM/DL — SIGNIFICANT CHANGE UP (ref 32–36)
MCV RBC AUTO: 95 FL — SIGNIFICANT CHANGE UP (ref 80–100)
NRBC # BLD: 0 /100 WBCS — SIGNIFICANT CHANGE UP
NRBC # FLD: 0 K/UL — SIGNIFICANT CHANGE UP
PLATELET # BLD AUTO: 272 K/UL — SIGNIFICANT CHANGE UP (ref 150–400)
POTASSIUM SERPL-MCNC: 4.2 MMOL/L — SIGNIFICANT CHANGE UP (ref 3.5–5.3)
POTASSIUM SERPL-SCNC: 4.2 MMOL/L — SIGNIFICANT CHANGE UP (ref 3.5–5.3)
RBC # BLD: 4.84 M/UL — SIGNIFICANT CHANGE UP (ref 4.2–5.8)
RBC # FLD: 12.8 % — SIGNIFICANT CHANGE UP (ref 10.3–14.5)
SODIUM SERPL-SCNC: 141 MMOL/L — SIGNIFICANT CHANGE UP (ref 135–145)
WBC # BLD: 8.28 K/UL — SIGNIFICANT CHANGE UP (ref 3.8–10.5)
WBC # FLD AUTO: 8.28 K/UL — SIGNIFICANT CHANGE UP (ref 3.8–10.5)

## 2021-01-08 PROCEDURE — 93010 ELECTROCARDIOGRAM REPORT: CPT

## 2021-01-08 NOTE — H&P PST ADULT - NEGATIVE GENERAL GENITOURINARY SYMPTOMS
no hematuria/no bladder infections/no incontinence/no dysuria/no urinary hesitancy/normal urinary frequency/no nocturia

## 2021-01-08 NOTE — H&P PST ADULT - NS MD HP INPLANTS MED DEV
left knee, hard ware to right ankle/Artificial joint left knee, hardware to right ankle/Artificial joint

## 2021-01-08 NOTE — H&P PST ADULT - NSICDXPASTMEDICALHX_GEN_ALL_CORE_FT
PAST MEDICAL HISTORY:  GERD (gastroesophageal reflux disease)     HTN (hypertension)     Hyperlipidemia     Inguinal hernia     Laryngeal polyp     Left rotator cuff tear     Obesity (BMI 30-39.9)     Osteoarthritis     Right rotator cuff tear     Sleep apnea had sleep studies 6 yrs ago - does not use CPAP machine

## 2021-01-08 NOTE — H&P PST ADULT - NSICDXPROBLEM_GEN_ALL_CORE_FT
PROBLEM DIAGNOSES  Problem: Unspecified disorder of synovium and tendon, left shoulder  Assessment and Plan: Patient tentatively scheduled for  left  shoulder arthroscopy, rotator cuff repair, decompression biceps tenodesis on 1/19/21.  Pre-op instructions provided. Pt given verbal and written instructions with teach back on chlorhexidine shampoo and pepcid. Pt verbalized understanding with return demonstration.   Covid testing scheduled prior to surgery as per patient.   Patient scheduled for medical evauation as per surgeon, Copy requested.     Problem: Abnormal EKG  Assessment and Plan: Patient under care of cardiologist.  Comparison EKG in chart  Last note, echo and stress results - requested.    Problem: CAMMIE (obstructive sleep apnea)  Assessment and Plan: OR booking notifed regarding positive CAMMIE.

## 2021-01-08 NOTE — H&P PST ADULT - NEGATIVE ENMT SYMPTOMS
no hearing difficulty/no ear pain/no tinnitus/no vertigo/no post-nasal discharge/no nose bleeds/no recurrent cold sores/no gum bleeding/no dry mouth

## 2021-01-08 NOTE — H&P PST ADULT - MUSCULOSKELETAL
detailed exam details… right shoulder/decreased ROM/decreased ROM due to pain left shoulder/decreased ROM/decreased ROM due to pain

## 2021-01-08 NOTE — H&P PST ADULT - NSICDXPASTSURGICALHX_GEN_ALL_CORE_FT
PAST SURGICAL HISTORY:  H/O arthroscopy of left knee multiple - over the yrs    H/O arthroscopy of shoulder right 2017    H/O bilateral inguinal hernia repair many yrs ago    H/O total knee replacement, left 2010    History of ankle surgery right 2019    Laryngeal polyp removed 25 yrs ago

## 2021-01-08 NOTE — H&P PST ADULT - HISTORY OF PRESENT ILLNESS
65 yrs old male with h/o left shoulder pain for > 2 months which has been getting progressively worse  and so had X rays and MRI and rotator cuff tear was noted . Patient  presents for preop eval to have left  shoulder arthroscopy, rotator cuff repair, decompression biceps tenodesis.

## 2021-01-11 PROBLEM — M75.101 UNSPECIFIED ROTATOR CUFF TEAR OR RUPTURE OF RIGHT SHOULDER, NOT SPECIFIED AS TRAUMATIC: Chronic | Status: ACTIVE | Noted: 2021-01-08

## 2021-01-11 PROBLEM — G47.30 SLEEP APNEA, UNSPECIFIED: Chronic | Status: ACTIVE | Noted: 2017-12-22

## 2021-01-11 PROBLEM — M75.102 UNSPECIFIED ROTATOR CUFF TEAR OR RUPTURE OF LEFT SHOULDER, NOT SPECIFIED AS TRAUMATIC: Chronic | Status: ACTIVE | Noted: 2021-01-08

## 2021-01-12 ENCOUNTER — APPOINTMENT (OUTPATIENT)
Dept: FAMILY MEDICINE | Facility: CLINIC | Age: 66
End: 2021-01-12
Payer: MEDICARE

## 2021-01-12 VITALS
RESPIRATION RATE: 14 BRPM | HEIGHT: 69 IN | SYSTOLIC BLOOD PRESSURE: 120 MMHG | TEMPERATURE: 98.3 F | HEART RATE: 50 BPM | OXYGEN SATURATION: 97 % | BODY MASS INDEX: 43.84 KG/M2 | WEIGHT: 296 LBS | DIASTOLIC BLOOD PRESSURE: 80 MMHG

## 2021-01-12 DIAGNOSIS — M24.812 OTHER SPECIFIC JOINT DERANGEMENTS OF LEFT SHOULDER, NOT ELSEWHERE CLASSIFIED: ICD-10-CM

## 2021-01-12 DIAGNOSIS — G89.29 DORSALGIA, UNSPECIFIED: ICD-10-CM

## 2021-01-12 DIAGNOSIS — M54.9 DORSALGIA, UNSPECIFIED: ICD-10-CM

## 2021-01-12 DIAGNOSIS — M67.922 UNSPECIFIED DISORDER OF SYNOVIUM AND TENDON, LEFT UPPER ARM: ICD-10-CM

## 2021-01-12 PROCEDURE — 99215 OFFICE O/P EST HI 40 MIN: CPT

## 2021-01-12 PROCEDURE — 99072 ADDL SUPL MATRL&STAF TM PHE: CPT

## 2021-01-12 RX ORDER — ASPIRIN 325 MG/1
TABLET, FILM COATED ORAL
Refills: 0 | Status: DISCONTINUED | COMMUNITY
End: 2021-01-12

## 2021-01-12 RX ORDER — TRAMADOL HYDROCHLORIDE 50 MG/1
50 TABLET, COATED ORAL
Qty: 30 | Refills: 0 | Status: DISCONTINUED | COMMUNITY
Start: 2017-12-11 | End: 2021-01-12

## 2021-01-12 RX ORDER — ALBUTEROL SULFATE 108 UG/1
108 (90 BASE) AEROSOL, METERED RESPIRATORY (INHALATION)
Qty: 2 | Refills: 0 | Status: DISCONTINUED | COMMUNITY
Start: 2018-12-26 | End: 2021-01-12

## 2021-01-12 RX ORDER — NAPROXEN 500 MG/1
500 TABLET ORAL
Qty: 60 | Refills: 3 | Status: DISCONTINUED | COMMUNITY
Start: 2017-10-16 | End: 2021-01-12

## 2021-01-12 RX ORDER — HYDROCORTISONE 10 MG/G
1 CREAM TOPICAL TWICE DAILY
Qty: 1 | Refills: 1 | Status: DISCONTINUED | COMMUNITY
Start: 2017-07-05 | End: 2021-01-12

## 2021-01-12 RX ORDER — CETIRIZINE HYDROCHLORIDE 10 MG/1
10 TABLET, FILM COATED ORAL
Qty: 10 | Refills: 0 | Status: DISCONTINUED | COMMUNITY
Start: 2017-07-05 | End: 2021-01-12

## 2021-01-12 NOTE — PLAN
[FreeTextEntry1] : Patient for medical  Clearance 66 y/o M presents for medical clearance prior to L shoulder repair, as per pt "for my rotator cuff and bicep tanglement." Denies recent illness or travel. No medical complaints at this time. Procedure at [facility] . Pt has no medical complaints at this time. \par Pt is stable and cleared for surgery.

## 2021-01-12 NOTE — HISTORY OF PRESENT ILLNESS
[No Pertinent Cardiac History] : no history of aortic stenosis, atrial fibrillation, coronary artery disease, recent myocardial infarction, or implantable device/pacemaker [Sleep Apnea] : sleep apnea [Smoker] : smoker [No Adverse Anesthesia Reaction] : no adverse anesthesia reaction in self or family member [Chronic Anticoagulation] : no chronic anticoagulation [Chronic Kidney Disease] : no chronic kidney disease [Diabetes] : no diabetes [FreeTextEntry1] : L shoulder repair  [FreeTextEntry2] : 01/19/21 [FreeTextEntry3] : Dr. Ochoa  [FreeTextEntry4] : Patient for medical  Clearance 66 y/o M presents for medical clearance prior to L shoulder repair, as per pt "for my rotator cuff and bicep tanglement." Denies recent illness or travel. No medical complaints at this time. Procedure at [facility]

## 2021-01-12 NOTE — ASSESSMENT
[Patient Optimized for Surgery] : Patient optimized for surgery [FreeTextEntry4] : Patient is medically Clear for Procedure

## 2021-01-15 DIAGNOSIS — Z01.818 ENCOUNTER FOR OTHER PREPROCEDURAL EXAMINATION: ICD-10-CM

## 2021-01-16 ENCOUNTER — APPOINTMENT (OUTPATIENT)
Dept: DISASTER EMERGENCY | Facility: CLINIC | Age: 66
End: 2021-01-16

## 2021-01-18 ENCOUNTER — TRANSCRIPTION ENCOUNTER (OUTPATIENT)
Age: 66
End: 2021-01-18

## 2021-01-18 VITALS
HEIGHT: 68 IN | SYSTOLIC BLOOD PRESSURE: 137 MMHG | TEMPERATURE: 98 F | DIASTOLIC BLOOD PRESSURE: 68 MMHG | WEIGHT: 246.04 LBS | RESPIRATION RATE: 18 BRPM | OXYGEN SATURATION: 98 % | HEART RATE: 48 BPM

## 2021-01-18 LAB — SARS-COV-2 N GENE NPH QL NAA+PROBE: NOT DETECTED

## 2021-01-18 NOTE — ASU PREOPERATIVE ASSESSMENT, ADULT ([facility]ARK ONLY) - NAME
Surgery History and Physical    Jessica Johns is a 48 y.o. female scheduled for laparoscopy removal of silastic ring, resection of gastric pouch, and repair of hiatal hernia with Dr Norah Hoang on June 27, 2018 at [facility]. Patient comes in office today for history and physical, and to receive preoperative liver shrinking diet. Patient with history of initial gastric bypass in 2002. 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 Patient height is 4'11'', weight is 195 pounds, BMI is 39.05    Patient Active Problem List    Diagnosis Date Noted    Acquired hypothyroidism 08/18/2017    Anemia     Status post gastric bypass for obesity 10/02/2010    Morbid obesity ([address] 10/02/2010    Sleep apnea 10/02/2010    Chronic low back pain 10/02/2010    Right knee pain 10/02/2010    Pain, upper back 10/02/2010     Past Medical History:   Diagnosis Date    Anemia     Arthritis     Chronic low back pain 10/2/2010    Coagulation disorder ([address]     ANEMIA    Dyspepsia and other specified disorders of function of stomach     GERD (gastroesophageal reflux disease)     Gigantomastia 10/2/2010    Ill-defined condition     ANXIETY    Incontinence of urine 10/2/2010    Morbid obesity ([address] 10/2/2010    Musculoskeletal disorder     Pain, upper back 10/2/2010    Psychosocial circumstance 10/2/2010    Right knee pain 10/2/2010    Shoulder pain 10/2/2010    Sleep apnea 10/2/2010    HAS NOT BEEN TESTED    SOB (shortness of breath) 10/2/2010    Status post gastric bypass for obesity 10/2/2010    Thyroid disease       Past Surgical History:   Procedure Laterality Date    GASTRIC BYPASS,OBESE<150CM AUREA-EN-Y  6/7/02    decompression of 5by 7cm chocolate cyst     HX ABDOMINOPLASTY  04    HX BREAST REDUCTION  2004    HX CHOLECYSTECTOMY  6/7/02        HX CYST REMOVAL  6/20/91    left wrist     HX GASTRIC BYPASS  2002    HX OTHER SURGICAL  05    thigh lift    HX TUBAL LIGATION  1995    [facility]    IR IVC FILTER  6/7/02        VAGOTOMY/PYLOROPLASTY,HI SELECT  6/7/02          Social History   Substance Use Topics    Smoking status: Former Smoker     Quit date: 2015    Smokeless tobacco: Never Used    Alcohol use Yes      Comment: rare 2-3 TIMES A YEAR      Family History   Problem Relation Age of Onset    Cancer Mother      BREAST    Heart defect Mother      irregular heat beat    Heart Disease Mother    Eugenia Claros Father     Hypertension Father     Diabetes Father     Heart Disease Father     Thyroid Disease Father     Diabetes Sister     Heart Disease Sister     Thyroid Disease Sister     Kidney Disease Sister     Diabetes Brother     Heart Disease Brother     Stroke Brother     Stroke Paternal Grandmother     Other Sister      BRAIN ANEURSYM    Drug Abuse Brother     Alcohol abuse Brother       Prior to Admission medications    Medication Sig Start Date End Date Taking? Authorizing Provider   raNITIdine (ZANTAC 75) 75 mg tablet Take 150 mg by mouth nightly. Yes Historical Provider   ondansetron (ZOFRAN ODT) 4 mg disintegrating tablet Take 1 Tab by mouth every six (6) hours as needed for Nausea. 5/25/18  Yes Arielle Garcia NP   levothyroxine (SYNTHROID) 100 mcg tablet TAKE 1 TABLET BY ORAL ROUTE EVERY DAY MONDAY TO SATURDAY, 1.5 TABLET SUNDAY 2/13/18  Yes Historical Provider   gabapentin (NEURONTIN) 300 mg capsule 900 mg three (3) times daily. 2/7/18  Yes Historical Provider   venlafaxine-SR (EFFEXOR-XR) 150 mg capsule TAKE ONE CAPSULE BY MOUTH EVERY MORNING 1/18/18  Yes Historical Provider   orphenadrine citrate (NORFLEX) 100 mg sr tablet two (2) times a day. 2/7/18  Yes Historical Provider   PREMPRO 0.625-2.5 mg per tablet 1 Tab daily. 9/10/17  Yes Historical Provider   omeprazole (PRILOSEC) 40 mg capsule Take 40 mg by mouth every morning.    Yes Historical Provider   cyanocobalamin (VITAMIN B-12) 1,000 mcg sublingual tablet Take 1,000 mcg by mouth daily. Yes Historical Provider   CALCIUM CARBONATE/VITAMIN D3 (CALCIUM + D PO) Take 1 Tab by mouth daily. Yes Historical Provider   MULTI-VITAMIN PO Take  by mouth. Yes Historical Provider   ERGOCALCIFEROL, VITAMIN D2, (VITAMIN D PO) Take 2,000 Units by mouth two (2) times a day. Yes Historical Provider     Allergies   Allergen Reactions    Aspirin Other (comments)     Due to GBP surgery    Other Medication Other (comments)     Steroids, can't take due to Gastric Bypass surgery           HPI    Review of Systems   Constitutional: Negative for fever. HENT: Negative for congestion, hearing loss and sinus pain. Eyes: Positive for blurred vision. Negative for double vision and photophobia. Eyeglasses for reading. Respiratory: Negative for cough, sputum production and shortness of breath. Cardiovascular: Negative for chest pain, palpitations and leg swelling. Gastrointestinal: Positive for constipation, heartburn, nausea and vomiting. Negative for abdominal pain and diarrhea. Genitourinary: Negative for dysuria, frequency and urgency. No kidney stone history   Musculoskeletal: Positive for back pain, joint pain and neck pain. Skin: Negative for itching and rash. Neurological: Negative for dizziness, seizures, loss of consciousness and headaches. Endo/Heme/Allergies:        No diabetes, no anemia, no gout. Psychiatric/Behavioral: The patient is nervous/anxious and has insomnia. Physical Exam   Constitutional: She is oriented to person, place, and time. She appears well-developed and well-nourished. No distress. Neck: Trachea normal and normal range of motion. Neck supple. No JVD present. Carotid bruit is not present. No tracheal deviation present. Cardiovascular: Normal rate, regular rhythm and normal heart sounds.     Pulmonary/Chest: Effort normal and breath sounds normal. No respiratory distress. She has no decreased breath sounds. She has no wheezes. She has no rhonchi. She has no rales. She exhibits no laceration, no crepitus and no retraction. Abdominal: Soft. Bowel sounds are normal. She exhibits no distension. There is no hepatosplenomegaly. There is no tenderness. There is no rebound and no guarding. Previous surgical incision dry and intact. No hernia/masses palpated    Musculoskeletal: Normal range of motion. She exhibits no edema. Lymphadenopathy:        Head (right side): No submental, no submandibular, no tonsillar, no preauricular and no posterior auricular adenopathy present. Head (left side): No submental, no submandibular, no tonsillar, no preauricular and no posterior auricular adenopathy present. She has no cervical adenopathy. She has no axillary adenopathy. Neurological: She is alert and oriented to person, place, and time. She has normal strength. Skin: Skin is warm and dry. No rash noted. No cyanosis or erythema. Nails show no clubbing. Psychiatric: She has a normal mood and affect. Her speech is normal and behavior is normal. Thought content normal.   Blood pressure 124/78, pulse 71, temperature 98 °F (36.7 °C), resp. rate 20, height 4' 11.25\" (1.505 m), weight 195 lb (88.5 kg), SpO2 97 %. ASSESSMENT and PLAN    Patient is scheduled for laparoscopy removal of silastic ring, resection of gastric pouch, and repair of hiatal hernia with Dr Aiden Khan on June 27, 2018 at [facility].Counseled patient in regard to post diet restrictions, follow- up office visits, follow- up care, and follow up medications. Patient as received educational booklet and vitamin list. Patient to start liver shrinking diet on June 13, 2018. Answered all questions and patient wishes to proceed. Signed By: Conor Ceron NP     May 25, 2018       27 minutes was spent with patient. Farhad

## 2021-01-19 ENCOUNTER — OUTPATIENT (OUTPATIENT)
Dept: OUTPATIENT SERVICES | Facility: HOSPITAL | Age: 66
LOS: 1 days | Discharge: HOME | End: 2021-01-19
Payer: MEDICARE

## 2021-01-19 ENCOUNTER — APPOINTMENT (OUTPATIENT)
Dept: ORTHOPEDIC SURGERY | Facility: AMBULATORY SURGERY CENTER | Age: 66
End: 2021-01-19

## 2021-01-19 VITALS
DIASTOLIC BLOOD PRESSURE: 62 MMHG | TEMPERATURE: 98 F | SYSTOLIC BLOOD PRESSURE: 111 MMHG | RESPIRATION RATE: 18 BRPM | OXYGEN SATURATION: 97 % | HEART RATE: 59 BPM

## 2021-01-19 DIAGNOSIS — Z98.890 OTHER SPECIFIED POSTPROCEDURAL STATES: Chronic | ICD-10-CM

## 2021-01-19 DIAGNOSIS — Z96.652 PRESENCE OF LEFT ARTIFICIAL KNEE JOINT: Chronic | ICD-10-CM

## 2021-01-19 DIAGNOSIS — M67.912 UNSPECIFIED DISORDER OF SYNOVIUM AND TENDON, LEFT SHOULDER: ICD-10-CM

## 2021-01-19 DIAGNOSIS — J38.1 POLYP OF VOCAL CORD AND LARYNX: Chronic | ICD-10-CM

## 2021-01-19 PROCEDURE — 29827 SHO ARTHRS SRG RT8TR CUF RPR: CPT | Mod: LT

## 2021-01-19 PROCEDURE — 29826 SHO ARTHRS SRG DECOMPRESSION: CPT | Mod: LT

## 2021-01-19 PROCEDURE — 29828 SHO ARTHRS SRG BICP TENODSIS: CPT | Mod: LT

## 2021-01-19 RX ORDER — AMLODIPINE BESYLATE 2.5 MG/1
1 TABLET ORAL
Qty: 0 | Refills: 0 | DISCHARGE

## 2021-01-19 RX ORDER — SIMVASTATIN 20 MG/1
1 TABLET, FILM COATED ORAL
Qty: 0 | Refills: 0 | DISCHARGE

## 2021-01-19 RX ORDER — SODIUM CHLORIDE 9 MG/ML
1000 INJECTION, SOLUTION INTRAVENOUS
Refills: 0 | Status: DISCONTINUED | OUTPATIENT
Start: 2021-01-19 | End: 2021-02-02

## 2021-01-19 RX ORDER — OXYCODONE HYDROCHLORIDE 5 MG/1
1 TABLET ORAL
Qty: 25 | Refills: 0
Start: 2021-01-19

## 2021-01-19 NOTE — BRIEF OPERATIVE NOTE - NSICDXBRIEFPROCEDURE_GEN_ALL_CORE_FT
PROCEDURES:  Tenodesis, biceps 19-Jan-2021 11:22:58  Keisha Banegas  Repair, subscapularis, arthroscopic 19-Jan-2021 11:22:37  Keisha Banegas

## 2021-01-19 NOTE — ASU DISCHARGE PLAN (ADULT/PEDIATRIC) - ASU DC SPECIAL INSTRUCTIONSFT
See Instruction sheet     Alternate between taking Ibuprofen and Tylenol so you are taking pain medication every 3-4 hours if your pain is severe. Take oxycodone in addition only if tylenol and ibuprofen are not working for your pain.    Narcotic pain medicine can cause extreme nausea and constipation. Drink plenty of water and take diuretics (colace, Miralax) as needed. You can get them from your local pharmacy.    It is important to ice your shoulder to keep swelling down and the pain manageable. Keep the ice on for 20 minutes, and then keep off for 20 minutes. Repeat while awake. See Instruction sheet.    Alternate between taking Ibuprofen and Tylenol so you are taking pain medication every 3-4 hours if your pain is severe. Take oxycodone in addition only if tylenol and ibuprofen are not working for your pain.    Narcotic pain medicine can cause extreme nausea and constipation. Drink plenty of water and take diuretics (colace, Miralax) as needed. You can get them from your local pharmacy.    It is important to ice your shoulder to keep swelling down and the pain manageable. Keep the ice on for 20 minutes, and then keep off for 20 minutes. Repeat while awake.

## 2021-01-19 NOTE — BRIEF OPERATIVE NOTE - NSICDXBRIEFPREOP_GEN_ALL_CORE_FT
PRE-OP DIAGNOSIS:  Partial tear of subscapularis tendon, initial encounter 19-Jan-2021 11:23:16  Keisha Banegas

## 2021-01-19 NOTE — ASU DISCHARGE PLAN (ADULT/PEDIATRIC) - CALL YOUR DOCTOR IF YOU HAVE ANY OF THE FOLLOWING:
Wound/Surgical Site with redness, or foul smelling discharge or pus/Numbness, tingling, color or temperature change to extremity Bleeding that does not stop/Fever greater than (need to indicate Fahrenheit or Celsius)/Wound/Surgical Site with redness, or foul smelling discharge or pus/Numbness, tingling, color or temperature change to extremity

## 2021-01-19 NOTE — ASU DISCHARGE PLAN (ADULT/PEDIATRIC) - FOLLOW UP APPOINTMENTS
[facility] ([facility]):
4
no

## 2021-01-19 NOTE — ASU DISCHARGE PLAN (ADULT/PEDIATRIC) - CARE PROVIDER_API CALL
Nasim Ochoa)  Orthopaedic Sports Medicine; Orthopaedic Surgery  [address], [facility] [facility] [address] [facility] [address]  [place] [address]  Phone: [phone]  Fax: [phone]  Established Patient  Follow Up Time:

## 2021-01-28 ENCOUNTER — APPOINTMENT (OUTPATIENT)
Dept: ORTHOPEDIC SURGERY | Facility: CLINIC | Age: 66
End: 2021-01-28
Payer: MEDICARE

## 2021-01-28 VITALS — TEMPERATURE: 97.2 F

## 2021-01-28 PROCEDURE — 99024 POSTOP FOLLOW-UP VISIT: CPT

## 2021-04-12 ENCOUNTER — APPOINTMENT (OUTPATIENT)
Dept: ORTHOPEDIC SURGERY | Facility: CLINIC | Age: 66
End: 2021-04-12
Payer: MEDICARE

## 2021-04-12 DIAGNOSIS — Z98.890 OTHER SPECIFIED POSTPROCEDURAL STATES: ICD-10-CM

## 2021-04-12 PROCEDURE — 99024 POSTOP FOLLOW-UP VISIT: CPT

## 2021-06-01 NOTE — ASU PREOPERATIVE ASSESSMENT, ADULT (IPARK ONLY) - NS AMB LOCKER KEY
staff What Is The Reason For Today's Visit?: Preventative Skin Check Additional History: Pt states he has no known history on his family of melanoma or NMSC but he may just not be aware

## 2021-06-14 ENCOUNTER — APPOINTMENT (OUTPATIENT)
Dept: ORTHOPEDIC SURGERY | Facility: CLINIC | Age: 66
End: 2021-06-14
Payer: MEDICARE

## 2021-06-14 DIAGNOSIS — M67.912 UNSPECIFIED DISORDER OF SYNOVIUM AND TENDON, LEFT SHOULDER: ICD-10-CM

## 2021-06-14 PROCEDURE — 99213 OFFICE O/P EST LOW 20 MIN: CPT

## 2021-08-09 NOTE — H&P PST ADULT - GASTROINTESTINAL DETAILS
Spoke with Daughter she stated that her mom tested positive for COVID and she was just wondering about quarintine and how she should handle things because she is patients primary care giver.  RN advised that she should keep mom quarintined into one room of the house and that she can treat symptoms such as headache, body ache, and fever with tylenol and motrin.  She was also advised that she can treat congestion with OTC medication such as mucinex.  RN advised patient that since she has been exposed to her mom since mom's symptoms started then she would be considered to be positive at this point to and should refrain from going to work or out in public and should quar intine as well she was advised that if any symptom started she can treat them with same medications as described as above.  RN stressed the importance of drinking plenty of fluids to stay hydrated, getting rest and making sure mom is eating food when she is able to.  Patient verbalized understanding and had no other questions or concerns.    nontender/soft

## 2022-02-15 ENCOUNTER — APPOINTMENT (OUTPATIENT)
Dept: PULMONOLOGY | Facility: CLINIC | Age: 67
End: 2022-02-15
Payer: COMMERCIAL

## 2022-02-15 VITALS — WEIGHT: 223 LBS | DIASTOLIC BLOOD PRESSURE: 70 MMHG | BODY MASS INDEX: 32.93 KG/M2 | SYSTOLIC BLOOD PRESSURE: 120 MMHG

## 2022-02-15 VITALS — OXYGEN SATURATION: 96 % | RESPIRATION RATE: 16 BRPM | HEART RATE: 53 BPM

## 2022-02-15 PROCEDURE — 99204 OFFICE O/P NEW MOD 45 MIN: CPT

## 2022-02-15 RX ORDER — OXYCODONE AND ACETAMINOPHEN 5; 325 MG/1; MG/1
5-325 TABLET ORAL
Qty: 25 | Refills: 0 | Status: COMPLETED | COMMUNITY
Start: 2021-01-19 | End: 2022-02-15

## 2022-02-15 RX ORDER — HYDROCODONE BITARTRATE AND ACETAMINOPHEN 5; 325 MG/1; MG/1
5-325 TABLET ORAL EVERY 6 HOURS
Qty: 28 | Refills: 0 | Status: COMPLETED | COMMUNITY
Start: 2020-12-21 | End: 2022-02-15

## 2022-02-15 NOTE — HISTORY OF PRESENT ILLNESS
[>= 30 pack years] : >= 30 pack years [Obstructive Sleep Apnea] : obstructive sleep apnea [Awakes Unrefreshed] : awakes unrefreshed [Awakes with Dry Mouth] : awakes with dry mouth [Awakes with Headache] : awakes with headache [Snoring] : snoring [Witnessed Apneas] : witnessed apneas [TextBox_4] : 66 years old retired [employer] and former smoker discontinued 15 to 17 years ago.  Does complain of mild cough since December following COVID-19.  Patient was only sick for several days with a fever.  He has not been vaccinated.  He does complain of baseline dyspnea on exertion.  There is no significant change with weather or season.  He denies any complaints of wheezing leg edema, paroxysmal nocturnal dyspnea or orthopnea.  He has seen ENT in the past but has been poorly compliant with nasal sprays for postnasal drip [TextBox_11] : 1 [TextBox_13] : 30 [YearQuit] : 2005 [TextBox_77] : 0000 [TextBox_79] : 9429 [TextBox_85] : 4-5 [TextBox_89] : 0 [TextBox_158] : [facility] [TextBox_165] : received new CPAP 3wks  after recall and not using\par Using FFM of unknown type and uncomfortable [ESS] : 14

## 2022-02-15 NOTE — DISCUSSION/SUMMARY
[FreeTextEntry1] : 66-year-old male with a history of obstructive sleep apnea of unknown severity.  Patient has not been compliant with CPAP and is therefore symptomatic secondary to a poor fitting mask.  The patient was provided with a sample F&P Od[place] 2 mask which was fitted on the patient's head by me.  Pathophysiology of sleep was discussed in detail and the patient was advised to place the my air tremaine on his phone.\par \par Complaints of dyspnea may be on the basis of deconditioning and body weight.  Considerations also include COPD in light of his prior work as well as tobacco use.

## 2022-03-21 ENCOUNTER — APPOINTMENT (OUTPATIENT)
Dept: PULMONOLOGY | Facility: CLINIC | Age: 67
End: 2022-03-21
Payer: COMMERCIAL

## 2022-03-21 VITALS
BODY MASS INDEX: 33.37 KG/M2 | RESPIRATION RATE: 16 BRPM | DIASTOLIC BLOOD PRESSURE: 80 MMHG | OXYGEN SATURATION: 97 % | SYSTOLIC BLOOD PRESSURE: 126 MMHG | WEIGHT: 226 LBS | HEART RATE: 48 BPM

## 2022-03-21 PROCEDURE — 99215 OFFICE O/P EST HI 40 MIN: CPT | Mod: 25

## 2022-03-21 PROCEDURE — 94727 GAS DIL/WSHOT DETER LNG VOL: CPT

## 2022-03-21 PROCEDURE — 94010 BREATHING CAPACITY TEST: CPT

## 2022-03-21 PROCEDURE — 85018 HEMOGLOBIN: CPT | Mod: QW

## 2022-03-21 PROCEDURE — 94729 DIFFUSING CAPACITY: CPT

## 2022-03-21 NOTE — HISTORY OF PRESENT ILLNESS
[Awakes Unrefreshed] : awakes unrefreshed [Obstructive Sleep Apnea] : obstructive sleep apnea [Awakes with Dry Mouth] : awakes with dry mouth [Awakes with Headache] : awakes with headache [Snoring] : snoring [Witnessed Apneas] : witnessed apneas [CPAP:] : CPAP [TextBox_4] : 66 years old retired [employer] and former smoker discontinued 15 to 17 years ago.  Does complain of mild cough since December following COVID-19.  Patient was only sick for several days with a fever.  He has not been vaccinated.  He does complain of baseline dyspnea on exertion.  There is no significant change with weather or season.  He denies any complaints of wheezing leg edema, paroxysmal nocturnal dyspnea or orthopnea.  He has seen ENT in the past but has been poorly compliant with nasal sprays for postnasal drip [TextBox_11] : 1 [TextBox_13] : 30 [YearQuit] : 2005 [TextBox_77] : 0000 [TextBox_79] : 9047 [TextBox_85] : 4-5 [TextBox_89] : 0 [TextBox_131] : 10 [TextBox_127] : 2/19/22 [TextBox_129] : 3/20/22 [TextBox_133] : 80 [TextBox_137] : 33 [TextBox_141] : 3 [TextBox_143] : 50 [TextBox_147] : 8.4 [TextBox_158] : [facility] [TextBox_165] : Having difficulty with mouth dryness with Nasal  mask [ESS] : 14

## 2022-03-21 NOTE — DISCUSSION/SUMMARY
[FreeTextEntry1] : 66-year-old male with a history of obstructive sleep apnea seen today in follow-up.  Compliance shows improvement which seems to be compromised by poor mask fit.  I therefore fitted the patient myself with an F&P Vitera as well as an increase in ramp to 40 and EPR to 3\par \par Complaints of dyspnea may be on the basis of deconditioning and body weight.  No evidence COPD

## 2022-06-20 ENCOUNTER — APPOINTMENT (OUTPATIENT)
Dept: PULMONOLOGY | Facility: CLINIC | Age: 67
End: 2022-06-20
Payer: COMMERCIAL

## 2022-06-20 VITALS
SYSTOLIC BLOOD PRESSURE: 122 MMHG | WEIGHT: 228 LBS | OXYGEN SATURATION: 97 % | HEART RATE: 54 BPM | RESPIRATION RATE: 16 BRPM | DIASTOLIC BLOOD PRESSURE: 78 MMHG | BODY MASS INDEX: 33.67 KG/M2

## 2022-06-20 DIAGNOSIS — R06.02 SHORTNESS OF BREATH: ICD-10-CM

## 2022-06-20 PROCEDURE — 99214 OFFICE O/P EST MOD 30 MIN: CPT

## 2022-06-20 RX ORDER — PENICILLIN V POTASSIUM 500 MG/1
500 TABLET, FILM COATED ORAL
Qty: 28 | Refills: 0 | Status: COMPLETED | COMMUNITY
Start: 2022-04-25

## 2022-06-20 RX ORDER — IBUPROFEN 800 MG/1
800 TABLET, FILM COATED ORAL
Qty: 20 | Refills: 0 | Status: COMPLETED | COMMUNITY
Start: 2022-04-25

## 2022-06-20 NOTE — DISCUSSION/SUMMARY
[FreeTextEntry1] : 67-year-old male with a history of obstructive sleep apnea seen today for complaints of cough as well as follow-up.  Patient remains noncompliant despite me providing him with a mask on his last visit.  He was explained in detail of the relationship between reflux sleep apnea and cough.  He does appear to understand now the relationship and is intent on restarting his PPI and CPAP

## 2022-06-20 NOTE — HISTORY OF PRESENT ILLNESS
[Obstructive Sleep Apnea] : obstructive sleep apnea [Awakes Unrefreshed] : awakes unrefreshed [Awakes with Dry Mouth] : awakes with dry mouth [Awakes with Headache] : awakes with headache [Snoring] : snoring [Witnessed Apneas] : witnessed apneas [CPAP:] : CPAP [TextBox_4] : 3/21/2022:\par 66 years old retired [employer] and former smoker discontinued 15 to 17 years ago.  Does complain of mild cough since December following COVID-19.  Patient was only sick for several days with a fever.  He has not been vaccinated.  He does complain of baseline dyspnea on exertion.  There is no significant change with weather or season.  He denies any complaints of wheezing leg edema, paroxysmal nocturnal dyspnea or orthopnea.  He has seen ENT in the past but has been poorly compliant with nasal sprays for postnasal drip\par \par 6/20/2022:\par Patient has been noncompliant with his CPAP since May 1.  Over the last month is noted a recurrence in his cough which he localizes to the base of his neck.  No increased shortness of breath.  He is also been noncompliant with his proton pump inhibitor.\par  [TextBox_11] : 1 [TextBox_13] : 30 [YearQuit] : 2005 [TextBox_77] : 0000 [TextBox_79] : 8685 [TextBox_85] : 4-5 [TextBox_89] : 0 [TextBox_131] : 10 [TextBox_127] : 3/21/2022 [TextBox_129] : 6/18/2022 [TextBox_133] : 28% [TextBox_137] : 14% [TextBox_141] : 3 [TextBox_143] : 46 [TextBox_147] : 8.1 [TextBox_158] : [facility] [TextBox_165] : Complains of not having supplies [ESS] : 14

## 2022-08-01 ENCOUNTER — APPOINTMENT (OUTPATIENT)
Dept: FAMILY MEDICINE | Facility: CLINIC | Age: 67
End: 2022-08-01

## 2022-08-01 VITALS
BODY MASS INDEX: 34.66 KG/M2 | HEART RATE: 62 BPM | HEIGHT: 69 IN | RESPIRATION RATE: 14 BRPM | WEIGHT: 234 LBS | DIASTOLIC BLOOD PRESSURE: 70 MMHG | TEMPERATURE: 97.9 F | OXYGEN SATURATION: 98 % | SYSTOLIC BLOOD PRESSURE: 132 MMHG

## 2022-08-01 DIAGNOSIS — Z20.822 CONTACT WITH AND (SUSPECTED) EXPOSURE TO COVID-19: ICD-10-CM

## 2022-08-01 PROCEDURE — 36415 COLL VENOUS BLD VENIPUNCTURE: CPT

## 2022-08-01 PROCEDURE — 99214 OFFICE O/P EST MOD 30 MIN: CPT | Mod: 25

## 2022-08-06 DIAGNOSIS — E88.81 METABOLIC SYNDROME: ICD-10-CM

## 2022-08-06 LAB
25(OH)D3 SERPL-MCNC: 46.1 NG/ML
ALBUMIN SERPL ELPH-MCNC: 4.9 G/DL
ALP BLD-CCNC: 102 U/L
ALT SERPL-CCNC: 19 U/L
ANION GAP SERPL CALC-SCNC: 14 MMOL/L
APPEARANCE: CLEAR
AST SERPL-CCNC: 18 U/L
BASOPHILS # BLD AUTO: 0.1 K/UL
BASOPHILS NFR BLD AUTO: 1.1 %
BILIRUB SERPL-MCNC: 0.7 MG/DL
BILIRUBIN URINE: NEGATIVE
BLOOD URINE: NEGATIVE
BUN SERPL-MCNC: 17 MG/DL
C PEPTIDE SERPL-MCNC: 7 NG/ML
CALCIUM SERPL-MCNC: 9.5 MG/DL
CHLORIDE SERPL-SCNC: 107 MMOL/L
CHOLEST SERPL-MCNC: 167 MG/DL
CO2 SERPL-SCNC: 22 MMOL/L
COLOR: YELLOW
COVID-19 NUCLEOCAPSID  GAM ANTIBODY INTERPRETATION: POSITIVE
COVID-19 SPIKE DOMAIN ANTIBODY INTERPRETATION: POSITIVE
CREAT SERPL-MCNC: 0.89 MG/DL
CREAT SPEC-SCNC: 210 MG/DL
EGFR: 95 ML/MIN/1.73M2
EOSINOPHIL # BLD AUTO: 0.06 K/UL
EOSINOPHIL NFR BLD AUTO: 0.6 %
ESTIMATED AVERAGE GLUCOSE: 114 MG/DL
FERRITIN SERPL-MCNC: 611 NG/ML
FOLATE SERPL-MCNC: >20 NG/ML
GLUCOSE QUALITATIVE U: NEGATIVE
GLUCOSE SERPL-MCNC: 137 MG/DL
HBA1C MFR BLD HPLC: 5.6 %
HCT VFR BLD CALC: 47.2 %
HDLC SERPL-MCNC: 52 MG/DL
HGB BLD-MCNC: 15.6 G/DL
IMM GRANULOCYTES NFR BLD AUTO: 0.3 %
IRON SATN MFR SERPL: 26 %
IRON SERPL-MCNC: 77 UG/DL
KETONES URINE: NEGATIVE
LDLC SERPL CALC-MCNC: 85 MG/DL
LEUKOCYTE ESTERASE URINE: NEGATIVE
LYMPHOCYTES # BLD AUTO: 1.81 K/UL
LYMPHOCYTES NFR BLD AUTO: 19.5 %
MAN DIFF?: NORMAL
MCHC RBC-ENTMCNC: 31.6 PG
MCHC RBC-ENTMCNC: 33.1 GM/DL
MCV RBC AUTO: 95.7 FL
MICROALBUMIN 24H UR DL<=1MG/L-MCNC: <1.2 MG/DL
MICROALBUMIN/CREAT 24H UR-RTO: NORMAL MG/G
MONOCYTES # BLD AUTO: 0.72 K/UL
MONOCYTES NFR BLD AUTO: 7.8 %
NEUTROPHILS # BLD AUTO: 6.55 K/UL
NEUTROPHILS NFR BLD AUTO: 70.7 %
NITRITE URINE: NEGATIVE
NONHDLC SERPL-MCNC: 115 MG/DL
PH URINE: 5.5
PLATELET # BLD AUTO: 267 K/UL
POTASSIUM SERPL-SCNC: 4.1 MMOL/L
PROT SERPL-MCNC: 7 G/DL
PROTEIN URINE: NORMAL
PSA SERPL-MCNC: 1.87 NG/ML
RBC # BLD: 4.93 M/UL
RBC # FLD: 13.2 %
SARS-COV-2 AB SERPL IA-ACNC: >250 U/ML
SARS-COV-2 AB SERPL QL IA: 13 INDEX
SODIUM SERPL-SCNC: 143 MMOL/L
SPECIFIC GRAVITY URINE: 1.03
T4 SERPL-MCNC: 6.7 UG/DL
TIBC SERPL-MCNC: 294 UG/DL
TRIGL SERPL-MCNC: 153 MG/DL
TSH SERPL-ACNC: 2.56 UIU/ML
UIBC SERPL-MCNC: 218 UG/DL
UROBILINOGEN URINE: NORMAL
VIT B12 SERPL-MCNC: 1208 PG/ML
WBC # FLD AUTO: 9.27 K/UL

## 2022-08-23 ENCOUNTER — APPOINTMENT (OUTPATIENT)
Dept: PULMONOLOGY | Facility: CLINIC | Age: 67
End: 2022-08-23

## 2022-08-23 VITALS
RESPIRATION RATE: 16 BRPM | SYSTOLIC BLOOD PRESSURE: 120 MMHG | HEART RATE: 56 BPM | DIASTOLIC BLOOD PRESSURE: 60 MMHG | OXYGEN SATURATION: 96 %

## 2022-08-23 PROCEDURE — 99214 OFFICE O/P EST MOD 30 MIN: CPT

## 2022-08-23 RX ORDER — ERGOCALCIFEROL 1.25 MG/1
1.25 MG CAPSULE, LIQUID FILLED ORAL
Qty: 12 | Refills: 3 | Status: COMPLETED | COMMUNITY
Start: 2020-07-16 | End: 2022-08-23

## 2022-08-23 RX ORDER — ZOLPIDEM TARTRATE 10 MG/1
10 TABLET ORAL
Qty: 1 | Refills: 0 | Status: ACTIVE | COMMUNITY
Start: 2022-08-23 | End: 1900-01-01

## 2022-08-23 RX ORDER — AMLODIPINE BESYLATE 10 MG/1
10 TABLET ORAL
Refills: 0 | Status: ACTIVE | COMMUNITY
Start: 2020-12-10

## 2022-08-23 NOTE — HISTORY OF PRESENT ILLNESS
[Obstructive Sleep Apnea] : obstructive sleep apnea [Awakes Unrefreshed] : awakes unrefreshed [Awakes with Dry Mouth] : awakes with dry mouth [Awakes with Headache] : awakes with headache [Snoring] : snoring [Witnessed Apneas] : witnessed apneas [CPAP:] : CPAP [TextBox_4] : 3/21/2022:\par 66 years old retired [employer] and former smoker discontinued 15 to 17 years ago.  Does complain of mild cough since December following COVID-19.  Patient was only sick for several days with a fever.  He has not been vaccinated.  He does complain of baseline dyspnea on exertion.  There is no significant change with weather or season.  He denies any complaints of wheezing leg edema, paroxysmal nocturnal dyspnea or orthopnea.  He has seen ENT in the past but has been poorly compliant with nasal sprays for postnasal drip.\par \par 6/20/2022:\par Patient has been noncompliant with his CPAP since May 1.  Over the last month is noted a recurrence in his cough which he localizes to the base of his neck.  No increased shortness of breath.  He is also been noncompliant with his proton pump inhibitor.\par \par 8/23/2022:\par Patient did resume his CPAP with the results of compliance below.  He has remained noncompliant with his nasal sprays.  He did resume use of his proton pump inhibitor.  He complains of no dyspnea on exertion except for weight.  He continues to complain of intermittent cough for the course of the day localized to the base of his neck.  No change in home or work environment [TextBox_11] : 1 [TextBox_13] : 30 [YearQuit] : 2005 [TextBox_77] : 4124 [TextBox_79] : 4102 [TextBox_85] : 4-5 [TextBox_89] : 0 [TextBox_131] : 10 [TextBox_127] : 7/24/2022 [TextBox_129] : 8/22/2022 [TextBox_133] : 43 [TextBox_137] : 23 [TextBox_141] : 4 [TextBox_143] : 23 [TextBox_147] : 9.3 [TextBox_158] : [facility] [TextBox_165] : States that he is not sure of how long he sleeps during the night [ESS] : 14

## 2022-08-23 NOTE — DISCUSSION/SUMMARY
[FreeTextEntry1] : 67-year-old male with a history of obstructive sleep apnea with complaints of a chronic cough seen today in follow-up.  Patient's compliance as well as his effectiveness of his CPAP are in question.  This may be leading to worsening reflux and subsequent cough.  Unfortunately the patient has not followed up with an ENT and is therefore being advised to do the same.  A repeat sleep study will be performed to reconfirm the severity of his obstructive sleep apnea with possible retitration or change in modality of his CPAP

## 2022-12-07 ENCOUNTER — APPOINTMENT (OUTPATIENT)
Dept: PULMONOLOGY | Facility: CLINIC | Age: 67
End: 2022-12-07

## 2022-12-07 VITALS
WEIGHT: 223 LBS | SYSTOLIC BLOOD PRESSURE: 114 MMHG | RESPIRATION RATE: 16 BRPM | HEART RATE: 49 BPM | HEIGHT: 69 IN | DIASTOLIC BLOOD PRESSURE: 80 MMHG | BODY MASS INDEX: 33.03 KG/M2 | OXYGEN SATURATION: 95 %

## 2022-12-07 PROCEDURE — 99214 OFFICE O/P EST MOD 30 MIN: CPT

## 2022-12-07 NOTE — HISTORY OF PRESENT ILLNESS
[Obstructive Sleep Apnea] : obstructive sleep apnea [Awakes Unrefreshed] : awakes unrefreshed [Awakes with Dry Mouth] : awakes with dry mouth [Awakes with Headache] : awakes with headache [Snoring] : snoring [Witnessed Apneas] : witnessed apneas [CPAP:] : CPAP [TextBox_4] : 3/21/2022:\par 66 years old retired [employer] and former smoker discontinued 15 to 17 years ago.  Does complain of mild cough since December following COVID-19.  Patient was only sick for several days with a fever.  He has not been vaccinated.  He does complain of baseline dyspnea on exertion.  There is no significant change with weather or season.  He denies any complaints of wheezing leg edema, paroxysmal nocturnal dyspnea or orthopnea.  He has seen ENT in the past but has been poorly compliant with nasal sprays for postnasal drip.\par \par 6/20/2022:\par Patient has been noncompliant with his CPAP since May 1.  Over the last month is noted a recurrence in his cough which he localizes to the base of his neck.  No increased shortness of breath.  He is also been noncompliant with his proton pump inhibitor.\par \par 8/23/2022:\par Patient did resume his CPAP with the results of compliance below.  He has remained noncompliant with his nasal sprays.  He did resume use of his proton pump inhibitor.  He complains of no dyspnea on exertion except for weight.  He continues to complain of intermittent cough for the course of the day localized to the base of his neck.  No change in home or work environment [TextBox_11] : 1 [TextBox_13] : 30 [YearQuit] : 2005 [TextBox_77] : 3080 [TextBox_79] : 5796 [TextBox_85] : 4-5 [TextBox_89] : 0 [TextBox_120] : CPAP 15 [TextBox_104] : 9/28/2022 [TextBox_131] : 10 [TextBox_127] : 7/24/2022 [TextBox_129] : 8/22/2022 [TextBox_133] : 43 [TextBox_137] : 23 [TextBox_141] : 4 [TextBox_143] : 23 [TextBox_147] : 9.3 [TextBox_158] : [facility] [TextBox_165] : Had new CPAP sleep study 2 months ago\par Noncompliant with CPAP\par Had repeat sleep study with results p [ESS] : 14

## 2022-12-07 NOTE — DISCUSSION/SUMMARY
[FreeTextEntry1] : 67-year-old male seen today for the above.  Patient has recent CPAP titration has demonstrated that his CPAP is under titrated possibly leading to worsening reflux and cough.  I have self adjusted his CPAP remotely to 15 and increased his humidity due to complaints of dryness.

## 2023-02-08 ENCOUNTER — APPOINTMENT (OUTPATIENT)
Dept: PULMONOLOGY | Facility: CLINIC | Age: 68
End: 2023-02-08
Payer: COMMERCIAL

## 2023-02-08 VITALS
SYSTOLIC BLOOD PRESSURE: 116 MMHG | DIASTOLIC BLOOD PRESSURE: 64 MMHG | BODY MASS INDEX: 32.34 KG/M2 | OXYGEN SATURATION: 97 % | WEIGHT: 219 LBS | RESPIRATION RATE: 16 BRPM | HEART RATE: 53 BPM

## 2023-02-08 PROCEDURE — 99213 OFFICE O/P EST LOW 20 MIN: CPT

## 2023-02-08 NOTE — HISTORY OF PRESENT ILLNESS
[Obstructive Sleep Apnea] : obstructive sleep apnea [Awakes Unrefreshed] : awakes unrefreshed [Awakes with Dry Mouth] : awakes with dry mouth [Awakes with Headache] : awakes with headache [Snoring] : snoring [Witnessed Apneas] : witnessed apneas [CPAP:] : CPAP [TextBox_4] : 3/21/2022:\par 66 years old retired [employer] and former smoker discontinued 15 to 17 years ago.  Does complain of mild cough since December following COVID-19.  Patient was only sick for several days with a fever.  He has not been vaccinated.  He does complain of baseline dyspnea on exertion.  There is no significant change with weather or season.  He denies any complaints of wheezing leg edema, paroxysmal nocturnal dyspnea or orthopnea.  He has seen ENT in the past but has been poorly compliant with nasal sprays for postnasal drip.\par \par 6/20/2022:\par Patient has been noncompliant with his CPAP since May 1.  Over the last month is noted a recurrence in his cough which he localizes to the base of his neck.  No increased shortness of breath.  He is also been noncompliant with his proton pump inhibitor.\par \par 8/23/2022:\par Patient did resume his CPAP with the results of compliance below.  He has remained noncompliant with his nasal sprays.  He did resume use of his proton pump inhibitor.  He complains of no dyspnea on exertion except for weight.  He continues to complain of intermittent cough for the course of the day localized to the base of his neck.  No change in home or work environment\par \par 2/8/2023:\par Follow-up with CPAP following reset of pressure to 10 cm H2O.  No further complaints of increased cough wheeze or shortness of breath [TextBox_77] : 2802 [TextBox_79] : 0721 [TextBox_85] : 4-5 [TextBox_89] : 0 [TextBox_120] : CPAP 15 [TextBox_104] : 9/28/2022 [TextBox_131] : 10 [TextBox_127] : 1/7/2023 [TextBox_129] : 2/5/2023 [TextBox_133] : 13 [TextBox_137] : 10 [TextBox_141] : 3 [TextBox_143] : 42 [TextBox_147] : 3.3 [TextBox_158] : [facility] [TextBox_162] : 2022 [TextBox_165] : Patient states that he has had difficulty with his CPAP secondary to drying.  He does use a fullface mask  [ESS] : 14

## 2023-02-08 NOTE — DISCUSSION/SUMMARY
[FreeTextEntry1] : 67-year-old male seen today for the above.  Patient's complaints of dryness secondary to his CPAP most likely on the basis of effects of his humidity.  I have recommended that he increase his settings to maximum.  Current level of CPAP is effective.
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
## 2023-04-07 ENCOUNTER — APPOINTMENT (OUTPATIENT)
Dept: PULMONOLOGY | Facility: CLINIC | Age: 68
End: 2023-04-07
Payer: MEDICARE

## 2023-04-07 DIAGNOSIS — R05.9 COUGH, UNSPECIFIED: ICD-10-CM

## 2023-04-07 DIAGNOSIS — G47.33 OBSTRUCTIVE SLEEP APNEA (ADULT) (PEDIATRIC): ICD-10-CM

## 2023-04-07 DIAGNOSIS — Z71.89 OTHER SPECIFIED COUNSELING: ICD-10-CM

## 2023-04-07 PROCEDURE — 99443: CPT

## 2023-04-07 NOTE — DISCUSSION/SUMMARY
[FreeTextEntry1] : 67-year-old male seen today in telephonically for follow-up of his obstructive sleep apnea.  Patient has been noncompliant but his settings have been adjusted by me remotely.  He was explained on the importance of therapy and will be followed up in 1 month's time.

## 2023-08-13 NOTE — HISTORY OF PRESENT ILLNESS
complains of pain/discomfort [FreeTextEntry8] : Pain and Stiffness to Neck x 1 week after playing golf.

## 2023-10-24 ENCOUNTER — APPOINTMENT (OUTPATIENT)
Dept: FAMILY MEDICINE | Facility: CLINIC | Age: 68
End: 2023-10-24
Payer: MEDICARE

## 2023-10-24 VITALS
HEIGHT: 69 IN | WEIGHT: 208 LBS | HEART RATE: 50 BPM | BODY MASS INDEX: 30.81 KG/M2 | DIASTOLIC BLOOD PRESSURE: 72 MMHG | SYSTOLIC BLOOD PRESSURE: 124 MMHG | OXYGEN SATURATION: 97 % | RESPIRATION RATE: 15 BRPM | TEMPERATURE: 97.2 F

## 2023-10-24 DIAGNOSIS — M23.91 UNSPECIFIED INTERNAL DERANGEMENT OF RIGHT KNEE: ICD-10-CM

## 2023-10-24 DIAGNOSIS — M25.561 PAIN IN RIGHT KNEE: ICD-10-CM

## 2023-10-24 DIAGNOSIS — R20.2 PARESTHESIA OF SKIN: ICD-10-CM

## 2023-10-24 PROCEDURE — 99214 OFFICE O/P EST MOD 30 MIN: CPT

## 2023-10-25 ENCOUNTER — NON-APPOINTMENT (OUTPATIENT)
Age: 68
End: 2023-10-25

## 2023-11-08 ENCOUNTER — NON-APPOINTMENT (OUTPATIENT)
Age: 68
End: 2023-11-08

## 2023-11-13 ENCOUNTER — APPOINTMENT (OUTPATIENT)
Dept: ORTHOPEDIC SURGERY | Facility: CLINIC | Age: 68
End: 2023-11-13
Payer: MEDICARE

## 2023-11-13 VITALS — WEIGHT: 202 LBS | BODY MASS INDEX: 29.92 KG/M2 | HEIGHT: 69 IN

## 2023-11-13 DIAGNOSIS — M17.11 UNILATERAL PRIMARY OSTEOARTHRITIS, RIGHT KNEE: ICD-10-CM

## 2023-11-13 PROCEDURE — 99204 OFFICE O/P NEW MOD 45 MIN: CPT

## 2023-12-18 ENCOUNTER — APPOINTMENT (OUTPATIENT)
Dept: ORTHOPEDIC SURGERY | Facility: CLINIC | Age: 68
End: 2023-12-18
Payer: MEDICARE

## 2023-12-18 VITALS — WEIGHT: 202 LBS | BODY MASS INDEX: 29.92 KG/M2 | HEIGHT: 69 IN

## 2023-12-18 DIAGNOSIS — M84.469A PATHOLOGICAL FRACTURE, UNSPECIFIED TIBIA AND FIBULA, INITIAL ENCOUNTER FOR FRACTURE: ICD-10-CM

## 2023-12-18 DIAGNOSIS — S83.241A OTHER TEAR OF MEDIAL MENISCUS, CURRENT INJURY, RIGHT KNEE, INITIAL ENCOUNTER: ICD-10-CM

## 2023-12-18 PROCEDURE — 99213 OFFICE O/P EST LOW 20 MIN: CPT

## 2023-12-18 NOTE — REASON FOR VISIT
[FreeTextEntry2] : 12/18/2023 The sharp inner sided right knee pain doing better, no mechanical symptoms

## 2023-12-18 NOTE — HISTORY OF PRESENT ILLNESS
[Dull/Aching] : dull/aching [Localized] : localized [Sleep] : sleep [de-identified] : Injured right knee dancing approx 6 weeks ago, twisted and felt sharp pain inner side of knee. Has prior left knee replacement, never any right knee issues. Not getting clicking, locking or giving way, just pain [Sharp] : sharp [Frequent] : frequent [] : no [FreeTextEntry1] : right knee  [FreeTextEntry3] : 9/30/23 [FreeTextEntry5] : patient injured his knee while dancing  [de-identified] : xrays

## 2023-12-18 NOTE — PHYSICAL EXAM
[Right] : right knee [NL (0)] : extension 0 degrees [5___] : hamstring 5[unfilled]/5 [] : negative Lachmann [Negative] : negative Naveen's [FreeTextEntry8] : improved [TWNoteComboBox7] : flexion 125 degrees

## 2024-03-28 NOTE — END OF VISIT
[Time Spent: ___ minutes] : I have spent [unfilled] minutes of time on the encounter. [Time Spent: ___ minutes] : I have spent [unfilled] minutes of time on the encounter.

## 2024-04-29 NOTE — H&P PST ADULT - NEGATIVE NEUROLOGICAL SYMPTOMS
Tablo continue to alarm. MD Pedersen called and made aware. MD Pedersen gave verbal order to use chest permacath. Tablo restarted at 9 pm. No alarms at this time. Will continue to monitor.   no transient paralysis/no tremors/no vertigo/no loss of sensation/no difficulty walking/no headache

## 2024-05-07 ENCOUNTER — APPOINTMENT (OUTPATIENT)
Dept: FAMILY MEDICINE | Facility: CLINIC | Age: 69
End: 2024-05-07
Payer: MEDICARE

## 2024-05-07 ENCOUNTER — NON-APPOINTMENT (OUTPATIENT)
Age: 69
End: 2024-05-07

## 2024-05-07 VITALS
RESPIRATION RATE: 16 BRPM | BODY MASS INDEX: 33.18 KG/M2 | DIASTOLIC BLOOD PRESSURE: 62 MMHG | WEIGHT: 224 LBS | SYSTOLIC BLOOD PRESSURE: 114 MMHG | OXYGEN SATURATION: 98 % | TEMPERATURE: 98.2 F | HEIGHT: 69 IN | HEART RATE: 49 BPM

## 2024-05-07 DIAGNOSIS — R51.9 HEADACHE, UNSPECIFIED: ICD-10-CM

## 2024-05-07 DIAGNOSIS — S20.212A CONTUSION OF LEFT FRONT WALL OF THORAX, INITIAL ENCOUNTER: ICD-10-CM

## 2024-05-07 DIAGNOSIS — S20.219S CONTUSION OF UNSPECIFIED FRONT WALL OF THORAX, SEQUELA: ICD-10-CM

## 2024-05-07 DIAGNOSIS — Z83.3 FAMILY HISTORY OF DIABETES MELLITUS: ICD-10-CM

## 2024-05-07 DIAGNOSIS — N40.1 BENIGN PROSTATIC HYPERPLASIA WITH LOWER URINARY TRACT SYMPMS: ICD-10-CM

## 2024-05-07 DIAGNOSIS — Z78.9 OTHER SPECIFIED HEALTH STATUS: ICD-10-CM

## 2024-05-07 DIAGNOSIS — Z87.891 PERSONAL HISTORY OF NICOTINE DEPENDENCE: ICD-10-CM

## 2024-05-07 DIAGNOSIS — E55.9 VITAMIN D DEFICIENCY, UNSPECIFIED: ICD-10-CM

## 2024-05-07 DIAGNOSIS — Z63.5 DISRUPTION OF FAMILY BY SEPARATION AND DIVORCE: ICD-10-CM

## 2024-05-07 DIAGNOSIS — Z80.9 FAMILY HISTORY OF MALIGNANT NEOPLASM, UNSPECIFIED: ICD-10-CM

## 2024-05-07 DIAGNOSIS — E66.3 OVERWEIGHT: ICD-10-CM

## 2024-05-07 DIAGNOSIS — K21.9 GASTRO-ESOPHAGEAL REFLUX DISEASE W/OUT ESOPHAGITIS: ICD-10-CM

## 2024-05-07 DIAGNOSIS — Z56.0 UNEMPLOYMENT, UNSPECIFIED: ICD-10-CM

## 2024-05-07 DIAGNOSIS — E78.5 HYPERLIPIDEMIA, UNSPECIFIED: ICD-10-CM

## 2024-05-07 DIAGNOSIS — Z82.49 FAMILY HISTORY OF ISCHEMIC HEART DISEASE AND OTHER DISEASES OF THE CIRCULATORY SYSTEM: ICD-10-CM

## 2024-05-07 DIAGNOSIS — R53.83 OTHER FATIGUE: ICD-10-CM

## 2024-05-07 DIAGNOSIS — Z12.5 ENCOUNTER FOR SCREENING FOR MALIGNANT NEOPLASM OF PROSTATE: ICD-10-CM

## 2024-05-07 DIAGNOSIS — I10 ESSENTIAL (PRIMARY) HYPERTENSION: ICD-10-CM

## 2024-05-07 DIAGNOSIS — N13.8 BENIGN PROSTATIC HYPERPLASIA WITH LOWER URINARY TRACT SYMPMS: ICD-10-CM

## 2024-05-07 PROCEDURE — 36415 COLL VENOUS BLD VENIPUNCTURE: CPT

## 2024-05-07 PROCEDURE — 99214 OFFICE O/P EST MOD 30 MIN: CPT

## 2024-05-07 PROCEDURE — G2211 COMPLEX E/M VISIT ADD ON: CPT

## 2024-05-07 SDOH — ECONOMIC STABILITY - INCOME SECURITY: UNEMPLOYMENT, UNSPECIFIED: Z56.0

## 2024-05-07 SDOH — SOCIAL STABILITY - SOCIAL INSECURITY: DISRUPTION OF FAMILY BY SEPARATION AND DIVORCE: Z63.5

## 2024-05-07 NOTE — HEALTH RISK ASSESSMENT
[Yes] : Yes [Monthly or less (1 pt)] : Monthly or less (1 point) [1 or 2 (0 pts)] : 1 or 2 (0 points) [Never (0 pts)] : Never (0 points) [No] : In the past 12 months have you used drugs other than those required for medical reasons? No [No falls in past year] : Patient reported no falls in the past year [0] : 2) Feeling down, depressed, or hopeless: Not at all (0) [PHQ-2 Negative - No further assessment needed] : PHQ-2 Negative - No further assessment needed [Patient/Caregiver not ready to engage] : , patient/caregiver not ready to engage [I will adhere to the patient's wishes.] : I will adhere to the patient's wishes. [Time Spent: ___ minutes] : Time Spent: [unfilled] minutes [Never] : Never [Audit-CScore] : 1 [de-identified] : Average [de-identified] : Average [[facility]] : 7 [[id number]] : 0 [AdvancecareDate] : 08/22

## 2024-05-07 NOTE — REVIEW OF SYSTEMS
[Patient Intake Form Reviewed] : Patient intake form was reviewed [Fatigue] : fatigue [Joint Pain] : joint pain [Back Pain] : back pain [Negative] : Heme/Lymph [FreeTextEntry2] : Overweight [FreeTextEntry5] : HTN, HLD [FreeTextEntry7] : GERD [FreeTextEntry9] :  (+) Right knee pain and right foot pain [FreeTextEntry1] : Low Vit. D

## 2024-05-07 NOTE — ASSESSMENT
[FreeTextEntry1] : HTN/Overweight/Fatigue - Labs for Fatigue and Dysmetabolism. HLD - Lipid Panel. PSA Screening - PSA. COVID 19 Screening - + COVID 19 Abs. Low Vit. D - Vit. D Level. Contusion Rib Cage - X Ray L Ribs.  F/U to Review Test Results.

## 2024-05-07 NOTE — PHYSICAL EXAM
[Well Nourished] : well nourished [Well Developed] : well developed [Well-Appearing] : well-appearing [Normal Sclera/Conjunctiva] : normal sclera/conjunctiva [PERRL] : pupils equal round and reactive to light [EOMI] : extraocular movements intact [Normal Outer Ear/Nose] : the outer ears and nose were normal in appearance [Normal Oropharynx] : the oropharynx was normal [No JVD] : no jugular venous distention [No Lymphadenopathy] : no lymphadenopathy [Supple] : supple [Thyroid Normal, No Nodules] : the thyroid was normal and there were no nodules present [No Respiratory Distress] : no respiratory distress  [No Accessory Muscle Use] : no accessory muscle use [Clear to Auscultation] : lungs were clear to auscultation bilaterally [Normal Rate] : normal rate  [Regular Rhythm] : with a regular rhythm [Normal S1, S2] : normal S1 and S2 [No Murmur] : no murmur heard [No Carotid Bruits] : no carotid bruits [No Abdominal Bruit] : a ~M bruit was not heard ~T in the abdomen [No Varicosities] : no varicosities [Pedal Pulses Present] : the pedal pulses are present [No Edema] : there was no peripheral edema [No Palpable Aorta] : no palpable aorta [No Extremity Clubbing/Cyanosis] : no extremity clubbing/cyanosis [Soft] : abdomen soft [Non Tender] : non-tender [Non-distended] : non-distended [No Masses] : no abdominal mass palpated [No HSM] : no HSM [Normal Bowel Sounds] : normal bowel sounds [No CVA Tenderness] : no CVA  tenderness [No Spinal Tenderness] : no spinal tenderness [No Rash] : no rash [Normal Gait] : normal gait [___/1] : [unfilled]/1   [___/3] : [unfilled]/3 [___/5] : [unfilled]/5 [___/4] : [unfilled]/4 [___/2] : [unfilled]/2 [___/8] : [unfilled]/8 [Normal] : Normal [Speech Grossly Normal] : speech grossly normal [Normal Affect] : the affect was normal [Alert and Oriented x3] : oriented to person, place, and time [Normal Insight/Judgement] : insight and judgment were intact [Comprehensive Foot Exam Normal] : Right and left foot were examined and both feet are normal. No ulcers in either foot. Toes are normal and with full ROM.  Normal tactile sensation with monofilament testing throughout both feet [de-identified] : Overweight [de-identified] : (+) TTP to right knee, decreased ROM with mild swelling. (+) Nathan test [TextBox_2] : Yes [TextBox_4] : HS [SlumsTotal] : 30

## 2024-05-13 LAB
25(OH)D3 SERPL-MCNC: 32.7 NG/ML
ALBUMIN SERPL ELPH-MCNC: 4.4 G/DL
ALP BLD-CCNC: 100 U/L
ALT SERPL-CCNC: 15 U/L
ANION GAP SERPL CALC-SCNC: 14 MMOL/L
APPEARANCE: CLEAR
AST SERPL-CCNC: 16 U/L
BILIRUB SERPL-MCNC: 0.5 MG/DL
BILIRUBIN URINE: NEGATIVE
BLOOD URINE: NEGATIVE
BUN SERPL-MCNC: 17 MG/DL
C PEPTIDE SERPL-MCNC: 5.1 NG/ML
CALCIUM SERPL-MCNC: 8.6 MG/DL
CHLORIDE SERPL-SCNC: 107 MMOL/L
CHOLEST SERPL-MCNC: 145 MG/DL
CO2 SERPL-SCNC: 22 MMOL/L
COLOR: YELLOW
CREAT SERPL-MCNC: 0.96 MG/DL
CREAT SPEC-SCNC: 99 MG/DL
EGFR: 86 ML/MIN/1.73M2
ESTIMATED AVERAGE GLUCOSE: 111 MG/DL
FERRITIN SERPL-MCNC: 538 NG/ML
FOLATE SERPL-MCNC: 12.9 NG/ML
GLUCOSE QUALITATIVE U: NEGATIVE MG/DL
GLUCOSE SERPL-MCNC: 142 MG/DL
HBA1C MFR BLD HPLC: 5.5 %
HDLC SERPL-MCNC: 54 MG/DL
IRON SATN MFR SERPL: 24 %
IRON SERPL-MCNC: 72 UG/DL
KETONES URINE: NEGATIVE MG/DL
LDLC SERPL CALC-MCNC: 75 MG/DL
LEUKOCYTE ESTERASE URINE: NEGATIVE
MICROALBUMIN 24H UR DL<=1MG/L-MCNC: <1.2 MG/DL
MICROALBUMIN/CREAT 24H UR-RTO: NORMAL MG/G
NITRITE URINE: NEGATIVE
NONHDLC SERPL-MCNC: 91 MG/DL
PH URINE: 5.5
POTASSIUM SERPL-SCNC: 4.2 MMOL/L
PROT SERPL-MCNC: 6.5 G/DL
PROTEIN URINE: NEGATIVE MG/DL
PSA SERPL-MCNC: 2.16 NG/ML
SODIUM SERPL-SCNC: 143 MMOL/L
SPECIFIC GRAVITY URINE: 1.02
T4 SERPL-MCNC: 6.7 UG/DL
TIBC SERPL-MCNC: 295 UG/DL
TRIGL SERPL-MCNC: 83 MG/DL
TSH SERPL-ACNC: 2.8 UIU/ML
UIBC SERPL-MCNC: 223 UG/DL
UROBILINOGEN URINE: 0.2 MG/DL
VIT B12 SERPL-MCNC: 629 PG/ML

## 2024-06-17 LAB
BASOPHILS # BLD AUTO: 0.07 K/UL
BASOPHILS NFR BLD AUTO: 0.9 %
EOSINOPHIL # BLD AUTO: 0.13 K/UL
EOSINOPHIL NFR BLD AUTO: 1.7 %
HCT VFR BLD CALC: 46.7 %
HGB BLD-MCNC: 15.4 G/DL
IMM GRANULOCYTES NFR BLD AUTO: 0.5 %
LYMPHOCYTES # BLD AUTO: 1.53 K/UL
LYMPHOCYTES NFR BLD AUTO: 20.2 %
MAN DIFF?: NORMAL
MCHC RBC-ENTMCNC: 32.3 PG
MCHC RBC-ENTMCNC: 33 GM/DL
MCV RBC AUTO: 97.9 FL
MONOCYTES # BLD AUTO: 0.7 K/UL
MONOCYTES NFR BLD AUTO: 9.3 %
NEUTROPHILS # BLD AUTO: 5.09 K/UL
NEUTROPHILS NFR BLD AUTO: 67.4 %
PLATELET # BLD AUTO: 238 K/UL
RBC # BLD: 4.77 M/UL
RBC # FLD: 13.6 %
WBC # FLD AUTO: 7.56 K/UL

## 2024-08-29 ENCOUNTER — APPOINTMENT (OUTPATIENT)
Dept: FAMILY MEDICINE | Facility: CLINIC | Age: 69
End: 2024-08-29
Payer: MEDICARE

## 2024-08-29 VITALS
HEART RATE: 49 BPM | HEIGHT: 68 IN | SYSTOLIC BLOOD PRESSURE: 130 MMHG | WEIGHT: 227 LBS | TEMPERATURE: 98.3 F | BODY MASS INDEX: 34.4 KG/M2 | DIASTOLIC BLOOD PRESSURE: 80 MMHG | RESPIRATION RATE: 16 BRPM | OXYGEN SATURATION: 99 %

## 2024-08-29 DIAGNOSIS — Z82.49 FAMILY HISTORY OF ISCHEMIC HEART DISEASE AND OTHER DISEASES OF THE CIRCULATORY SYSTEM: ICD-10-CM

## 2024-08-29 DIAGNOSIS — Z83.3 FAMILY HISTORY OF DIABETES MELLITUS: ICD-10-CM

## 2024-08-29 DIAGNOSIS — Z80.9 FAMILY HISTORY OF MALIGNANT NEOPLASM, UNSPECIFIED: ICD-10-CM

## 2024-08-29 DIAGNOSIS — M54.17 RADICULOPATHY, LUMBOSACRAL REGION: ICD-10-CM

## 2024-08-29 DIAGNOSIS — M54.50 LOW BACK PAIN, UNSPECIFIED: ICD-10-CM

## 2024-08-29 DIAGNOSIS — R29.898 OTHER SYMPTOMS AND SIGNS INVOLVING THE MUSCULOSKELETAL SYSTEM: ICD-10-CM

## 2024-08-29 DIAGNOSIS — Z12.11 ENCOUNTER FOR SCREENING FOR MALIGNANT NEOPLASM OF COLON: ICD-10-CM

## 2024-08-29 PROCEDURE — G2211 COMPLEX E/M VISIT ADD ON: CPT

## 2024-08-29 PROCEDURE — 99214 OFFICE O/P EST MOD 30 MIN: CPT

## 2024-08-29 NOTE — HISTORY OF PRESENT ILLNESS
[FreeTextEntry1] : C/O Back Pain x 3 Months  [de-identified] : C/O Back Pain x 3 Months   Pt. states that his symptoms have been worsening despite using motrin and tylenol and getting chiropractic treatments 2 x wk x the past 8 weeks.

## 2024-08-29 NOTE — PHYSICAL EXAM
[Well Nourished] : well nourished [Well Developed] : well developed [Well-Appearing] : well-appearing [Normal Sclera/Conjunctiva] : normal sclera/conjunctiva [PERRL] : pupils equal round and reactive to light [EOMI] : extraocular movements intact [Normal Outer Ear/Nose] : the outer ears and nose were normal in appearance [Normal Oropharynx] : the oropharynx was normal [No JVD] : no jugular venous distention [No Lymphadenopathy] : no lymphadenopathy [Supple] : supple [Thyroid Normal, No Nodules] : the thyroid was normal and there were no nodules present [No Respiratory Distress] : no respiratory distress  [No Accessory Muscle Use] : no accessory muscle use [Clear to Auscultation] : lungs were clear to auscultation bilaterally [Normal Rate] : normal rate  [Regular Rhythm] : with a regular rhythm [Normal S1, S2] : normal S1 and S2 [No Murmur] : no murmur heard [No Carotid Bruits] : no carotid bruits [No Abdominal Bruit] : a ~M bruit was not heard ~T in the abdomen [No Varicosities] : no varicosities [Pedal Pulses Present] : the pedal pulses are present [No Edema] : there was no peripheral edema [No Palpable Aorta] : no palpable aorta [No Extremity Clubbing/Cyanosis] : no extremity clubbing/cyanosis [Soft] : abdomen soft [Non Tender] : non-tender [Non-distended] : non-distended [No Masses] : no abdominal mass palpated [No HSM] : no HSM [Normal Bowel Sounds] : normal bowel sounds [No Rash] : no rash [___/1] : [unfilled]/1   [___/3] : [unfilled]/3 [___/5] : [unfilled]/5 [___/4] : [unfilled]/4 [___/2] : [unfilled]/2 [___/8] : [unfilled]/8 [Normal] : Normal [Speech Grossly Normal] : speech grossly normal [Normal Affect] : the affect was normal [Alert and Oriented x3] : oriented to person, place, and time [Normal Insight/Judgement] : insight and judgment were intact [Comprehensive Foot Exam Normal] : Right and left foot were examined and both feet are normal. No ulcers in either foot. Toes are normal and with full ROM.  Normal tactile sensation with monofilament testing throughout both feet [de-identified] : L Spine - Decreased ROM, Pain Associated with Mvt. + BL Leg Raise Tests. [de-identified] : Overweight [de-identified] : Decreased Strength R> L. [de-identified] : Decreased BL LE DTR's [TextBox_2] : Yes [TextBox_4] : HS [SlumsTotal] : 30

## 2024-08-29 NOTE — ASSESSMENT
[FreeTextEntry1] : Back Pain with Radiculopathy - X Ray/MRI L Spine.  Motrin + Tylenol.  Lidoderm Patch

## 2024-08-29 NOTE — HEALTH RISK ASSESSMENT
[Yes] : Yes [Monthly or less (1 pt)] : Monthly or less (1 point) [1 or 2 (0 pts)] : 1 or 2 (0 points) [Never (0 pts)] : Never (0 points) [No] : In the past 12 months have you used drugs other than those required for medical reasons? No [No falls in past year] : Patient reported no falls in the past year [0] : 2) Feeling down, depressed, or hopeless: Not at all (0) [PHQ-2 Negative - No further assessment needed] : PHQ-2 Negative - No further assessment needed [Patient/Caregiver not ready to engage] : , patient/caregiver not ready to engage [I will adhere to the patient's wishes.] : I will adhere to the patient's wishes. [Time Spent: ___ minutes] : Time Spent: [unfilled] minutes [Never] : Never [Audit-CScore] : 1 [de-identified] : Average [de-identified] : Average [[facility]] : 7 [[id number]] : 0 [AdvancecareDate] : 08/22

## 2024-09-05 RX ORDER — LIDOCAINE 5% 700 MG/1
5 PATCH TOPICAL
Qty: 2 | Refills: 2 | Status: ACTIVE | COMMUNITY
Start: 2024-08-29 | End: 1900-01-01

## 2024-09-12 DIAGNOSIS — F41.9 ANXIETY DISORDER, UNSPECIFIED: ICD-10-CM

## 2024-09-12 RX ORDER — ALPRAZOLAM 0.5 MG/1
0.5 TABLET ORAL
Qty: 90 | Refills: 0 | Status: ACTIVE | COMMUNITY
Start: 2024-09-12 | End: 1900-01-01

## 2024-11-11 ENCOUNTER — NON-APPOINTMENT (OUTPATIENT)
Age: 69
End: 2024-11-11

## 2024-11-11 ENCOUNTER — LABORATORY RESULT (OUTPATIENT)
Age: 69
End: 2024-11-11

## 2024-11-11 ENCOUNTER — APPOINTMENT (OUTPATIENT)
Dept: FAMILY MEDICINE | Facility: CLINIC | Age: 69
End: 2024-11-11
Payer: MEDICARE

## 2024-11-11 VITALS
HEART RATE: 78 BPM | RESPIRATION RATE: 14 BRPM | DIASTOLIC BLOOD PRESSURE: 80 MMHG | BODY MASS INDEX: 34.86 KG/M2 | SYSTOLIC BLOOD PRESSURE: 126 MMHG | OXYGEN SATURATION: 98 % | WEIGHT: 230 LBS | HEIGHT: 68 IN

## 2024-11-11 DIAGNOSIS — R97.20 ELEVATED PROSTATE, SPECIFIC ANTIGEN [PSA]: ICD-10-CM

## 2024-11-11 DIAGNOSIS — Z63.5 DISRUPTION OF FAMILY BY SEPARATION AND DIVORCE: ICD-10-CM

## 2024-11-11 DIAGNOSIS — Z87.891 PERSONAL HISTORY OF NICOTINE DEPENDENCE: ICD-10-CM

## 2024-11-11 DIAGNOSIS — Z78.9 OTHER SPECIFIED HEALTH STATUS: ICD-10-CM

## 2024-11-11 DIAGNOSIS — Z56.0 UNEMPLOYMENT, UNSPECIFIED: ICD-10-CM

## 2024-11-11 DIAGNOSIS — Z82.49 FAMILY HISTORY OF ISCHEMIC HEART DISEASE AND OTHER DISEASES OF THE CIRCULATORY SYSTEM: ICD-10-CM

## 2024-11-11 DIAGNOSIS — Z12.5 ENCOUNTER FOR SCREENING FOR MALIGNANT NEOPLASM OF PROSTATE: ICD-10-CM

## 2024-11-11 DIAGNOSIS — Z80.9 FAMILY HISTORY OF MALIGNANT NEOPLASM, UNSPECIFIED: ICD-10-CM

## 2024-11-11 DIAGNOSIS — Z83.3 FAMILY HISTORY OF DIABETES MELLITUS: ICD-10-CM

## 2024-11-11 DIAGNOSIS — Z01.818 ENCOUNTER FOR OTHER PREPROCEDURAL EXAMINATION: ICD-10-CM

## 2024-11-11 DIAGNOSIS — N40.1 BENIGN PROSTATIC HYPERPLASIA WITH LOWER URINARY TRACT SYMPMS: ICD-10-CM

## 2024-11-11 DIAGNOSIS — N13.8 BENIGN PROSTATIC HYPERPLASIA WITH LOWER URINARY TRACT SYMPMS: ICD-10-CM

## 2024-11-11 PROCEDURE — 93000 ELECTROCARDIOGRAM COMPLETE: CPT

## 2024-11-11 PROCEDURE — G2211 COMPLEX E/M VISIT ADD ON: CPT

## 2024-11-11 PROCEDURE — 99214 OFFICE O/P EST MOD 30 MIN: CPT

## 2024-11-11 SDOH — SOCIAL STABILITY - SOCIAL INSECURITY: DISRUPTION OF FAMILY BY SEPARATION AND DIVORCE: Z63.5

## 2024-11-11 SDOH — ECONOMIC STABILITY - INCOME SECURITY: UNEMPLOYMENT, UNSPECIFIED: Z56.0

## 2024-11-12 LAB
ALBUMIN SERPL ELPH-MCNC: 4.6 G/DL
ALP BLD-CCNC: 102 U/L
ALT SERPL-CCNC: 25 U/L
ANION GAP SERPL CALC-SCNC: 12 MMOL/L
APPEARANCE: CLEAR
AST SERPL-CCNC: 23 U/L
BASOPHILS # BLD AUTO: 0.09 K/UL
BASOPHILS NFR BLD AUTO: 1.3 %
BILIRUB SERPL-MCNC: 0.7 MG/DL
BILIRUBIN URINE: NEGATIVE
BLOOD URINE: ABNORMAL
BUN SERPL-MCNC: 14 MG/DL
CALCIUM SERPL-MCNC: 9.3 MG/DL
CHLORIDE SERPL-SCNC: 105 MMOL/L
CO2 SERPL-SCNC: 24 MMOL/L
COLOR: YELLOW
CREAT SERPL-MCNC: 1.04 MG/DL
EGFR: 78 ML/MIN/1.73M2
EOSINOPHIL # BLD AUTO: 0.1 K/UL
EOSINOPHIL NFR BLD AUTO: 1.4 %
GLUCOSE QUALITATIVE U: NEGATIVE MG/DL
GLUCOSE SERPL-MCNC: 102 MG/DL
HCT VFR BLD CALC: 50.9 %
HGB BLD-MCNC: 16.9 G/DL
IMM GRANULOCYTES NFR BLD AUTO: 0.6 %
KETONES URINE: NEGATIVE MG/DL
LEUKOCYTE ESTERASE URINE: NEGATIVE
LYMPHOCYTES # BLD AUTO: 1.48 K/UL
LYMPHOCYTES NFR BLD AUTO: 20.8 %
MAN DIFF?: NORMAL
MCHC RBC-ENTMCNC: 32.1 PG
MCHC RBC-ENTMCNC: 33.2 G/DL
MCV RBC AUTO: 96.6 FL
MONOCYTES # BLD AUTO: 0.69 K/UL
MONOCYTES NFR BLD AUTO: 9.7 %
NEUTROPHILS # BLD AUTO: 4.7 K/UL
NEUTROPHILS NFR BLD AUTO: 66.2 %
NITRITE URINE: NEGATIVE
PH URINE: 5.5
PLATELET # BLD AUTO: 306 K/UL
POTASSIUM SERPL-SCNC: 4.8 MMOL/L
PROT SERPL-MCNC: 6.9 G/DL
PROTEIN URINE: NEGATIVE MG/DL
RBC # BLD: 5.27 M/UL
RBC # FLD: 13.2 %
SODIUM SERPL-SCNC: 142 MMOL/L
SPECIFIC GRAVITY URINE: 1.02
UROBILINOGEN URINE: 0.2 MG/DL
WBC # FLD AUTO: 7.1 K/UL

## 2024-11-14 LAB — URINE CULTURE <10: NORMAL

## 2025-02-13 ENCOUNTER — NON-APPOINTMENT (OUTPATIENT)
Age: 70
End: 2025-02-13

## 2025-02-13 DIAGNOSIS — D12.6 BENIGN NEOPLASM OF COLON, UNSPECIFIED: ICD-10-CM

## 2025-02-13 RX ORDER — SODIUM SULFATE, POTASSIUM SULFATE AND MAGNESIUM SULFATE 1.6; 3.13; 17.5 G/177ML; G/177ML; G/177ML
17.5-3.13-1.6 SOLUTION ORAL
Qty: 1 | Refills: 0 | Status: ACTIVE | COMMUNITY
Start: 2025-02-13 | End: 1900-01-01

## 2025-04-15 ENCOUNTER — APPOINTMENT (OUTPATIENT)
Dept: PULMONOLOGY | Facility: CLINIC | Age: 70
End: 2025-04-15
Payer: COMMERCIAL

## 2025-04-15 VITALS
OXYGEN SATURATION: 96 % | BODY MASS INDEX: 3.33 KG/M2 | HEART RATE: 50 BPM | HEIGHT: 68 IN | SYSTOLIC BLOOD PRESSURE: 122 MMHG | DIASTOLIC BLOOD PRESSURE: 60 MMHG | WEIGHT: 22 LBS | RESPIRATION RATE: 16 BRPM

## 2025-04-15 DIAGNOSIS — G47.33 OBSTRUCTIVE SLEEP APNEA (ADULT) (PEDIATRIC): ICD-10-CM

## 2025-04-15 PROCEDURE — G2211 COMPLEX E/M VISIT ADD ON: CPT

## 2025-04-15 PROCEDURE — 99214 OFFICE O/P EST MOD 30 MIN: CPT

## 2025-05-01 NOTE — H&P PST ADULT - HEMATOLOGY/LYMPHATICS
[de-identified] : Ms. ADRIANA REDDING is a 60-year-old woman, referred by Dr. Antelmo Hernandez for consultation regarding a liver lesion, here for a follow-up visit.   Adriana reports a palpable abdominal mass present for ~4 years, starting in , it has not changed in size, denies any pain or tenderness. She also notes a soft tissue mass to her LUE that she believes is also unchanged. In 2024, she presented to Dr. Antelmo Hernandez with both of these issues, he in turn sent her for a CT A/P that again showed 2 liver masses previously seen 10/2023.  CT A/P (@ [facility]) 10/2023  - liver is enlarged with the right hepatic lobe measuring 19.8 cm in craniocaudal dimension, heterogenous attenuation of the liver - in the inferior right hepatic lobe there is a lobular, partly calcified, hypo enhancing lesion measuring 7 cm, incompletely characterized on this single-phase exam -> f/u w/ liver protocol MRI   LUE U/S 2024 (@ [facility]) - 5.5 x 1.5 cm echogenic subdermal subq mass at the site of concern in left posterosuperior shoulder, nonspecific on U/S and could reflect a lipoma -> further eval w/ contrast MRI recommended   CT A/P 2024 (@ [facility]) - redemonstration of a heterogenous mass with associated calcifications in the anterior segment of the right lobe inferiorly, measures up to 6.7 x 5.8 cm and appears slightly smaller than previous study - there is a stable subcapsular subtle hypoattenuating mass in the anterior segment of the right lobe of the liver measuring 9 mm - no new liver masses  - rectus diastasis w/ a small periumbilical fat-containing hernia   PMH: DM, HTN, HLD, ?hypothyroid  PSH:  B/L knee replacement (2016 & 2019) Meds:  metformin, amlodipine, atorvastatin and Lantus  ALL:  lisinopril (swelling), oxycodone (swelling) SH:  denies ETOH or tobacco use, lives w/ her mom & daughter, not currently working FH: denies any family hx of malignancy  GYN: Menarche 13. Menopause 49. . Age of first full-term pregnancy 29. Denies OCP/HRT  EGD & colonoscopy- 2023 and WNL ECOG 0  liver protocol CT A/P 2024 (@ [facility]) - a 5.9 cm right hepatic lobe heterogenous centrally hypoattenuating lesion demonstrating patchy peripheral enhancement in the delayed phase w/ curvilinear calcifications in the center - there is mild capsular nodularity of the right anterior hepatic lobe secondary to the mass - right hepatic lobe predominant segmental hyperenhancement, which demonstrates isoenhancement in the delayed phase c/w differential perfusion  * Imaging characteristics suggestive of sclerosing hemangioma, however, recommend further eval w/ an MRI abdomen in 3-6 months*  2024 - Adriana presents for an initial consultation after being referred by Dr. Hernandez. She denies any abdominal pain, does note the palpable abdominal mass but it is painless/nontender. Her appetite and weight are well maintained, she does note occasional constipation, but it is a longstanding issue. She is otherwise in her usual state of health.  We reviewed the liver protocol CT scan with radiology.  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  Adriana MRI and will undergo the follow-up with us in 1 month.  MR/MRCP 2024 - mild hepatomegaly - heterogenous exophytic segment 5 calcified liver mass, indeterminate, but slightly decreased from 10/2023 and could represent an unusual presentation of a benign mass such as sclerosed hemangioma, similar numerous coalescent nodules extend superiorly to the liver dome  2024 - Adriana returns for ongoing follow-up and to discuss recent imaging, she denies any new health issues since our last visit. Adriana will follow-up in 6 months after a repeat MRI.   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 - Indeterminate 1.3 cm left hepatic lobe lesion is stable from 2024. -  Mildly enlarged bilateral axillary lymph nodes are partially imaged. Nonspecific bone marrow heterogeneity is increased from prior exam -> Consider workup for lymphoproliferative disorder  3/6/2025 - Rosalina returns for ongoing follow-up, she denies any new or worrisome health issues. She feels well overall; appetite & weight are maintained. She denies any B symptoms. We discussed Adriana's recent imaging.  She will undergo a mammogram and sonogram and PET/CT in the near future.  She will follow-up with us in approximate 1 month.  PET/CT 3/2025 - hypermetabolic prominent to enlarged LN both above & below the diaphragm, the most intensely FDG avid include B/L upper cervical stations, B/L axillary nodes & B/L inguinal nodes. Additional hypermetabolic nodes involve the B/L pelvic debo station -> differentials include lymphoma & lymphoproliferative disorder - intense hypermetabolism is visualized within the B/L parotid glands, which may be secondary benign or malignant etiology such as Liam gland tumor or pleomorphic adenoma -> consider f/u US & US bx - normal sized spleen however w/ mildly increased diffuse hypermetabolism relative to liver activtiy - noonavid fluid density ovoid structure within the posterior RIGHT breast may be r/t cyst - however correlate w/ mammo/sono - no hypermetabolism to a calcified inferior right hepatic lobe mass described on MRI from 2025 - enlarged left thyroid gland containing inferior coarsely calcified low-density structure suggestive for nodule - correlate w/ thyroid US - intense hypermetabolism within the cecum, most of the uptake is associated w/ artifact from pooling of oral contrast - however this may limit sensitivity for any potential small lesions, clinical factors will determine the need for Cscope as warranted  
details…

## 2025-08-20 ENCOUNTER — TRANSCRIPTION ENCOUNTER (OUTPATIENT)
Age: 70
End: 2025-08-20

## 2025-08-20 ENCOUNTER — APPOINTMENT (OUTPATIENT)
Dept: GASTROENTEROLOGY | Facility: HOSPITAL | Age: 70
End: 2025-08-20

## 2025-08-20 ENCOUNTER — RESULT REVIEW (OUTPATIENT)
Age: 70
End: 2025-08-20

## 2025-08-20 ENCOUNTER — OUTPATIENT (OUTPATIENT)
Dept: OUTPATIENT SERVICES | Facility: HOSPITAL | Age: 70
LOS: 1 days | End: 2025-08-20
Payer: COMMERCIAL

## 2025-08-20 VITALS
OXYGEN SATURATION: 100 % | HEART RATE: 44 BPM | TEMPERATURE: 97 F | DIASTOLIC BLOOD PRESSURE: 74 MMHG | RESPIRATION RATE: 19 BRPM | SYSTOLIC BLOOD PRESSURE: 122 MMHG

## 2025-08-20 VITALS
WEIGHT: 225.09 LBS | RESPIRATION RATE: 22 BRPM | DIASTOLIC BLOOD PRESSURE: 77 MMHG | SYSTOLIC BLOOD PRESSURE: 151 MMHG | HEIGHT: 68 IN | HEART RATE: 51 BPM | OXYGEN SATURATION: 98 % | TEMPERATURE: 98 F

## 2025-08-20 DIAGNOSIS — D12.6 BENIGN NEOPLASM OF COLON, UNSPECIFIED: ICD-10-CM

## 2025-08-20 DIAGNOSIS — Z98.890 OTHER SPECIFIED POSTPROCEDURAL STATES: Chronic | ICD-10-CM

## 2025-08-20 DIAGNOSIS — K92.9 DISEASE OF DIGESTIVE SYSTEM, UNSPECIFIED: ICD-10-CM

## 2025-08-20 DIAGNOSIS — Z96.652 PRESENCE OF LEFT ARTIFICIAL KNEE JOINT: Chronic | ICD-10-CM

## 2025-08-20 DIAGNOSIS — J38.1 POLYP OF VOCAL CORD AND LARYNX: Chronic | ICD-10-CM

## 2025-08-20 PROCEDURE — 45385 COLONOSCOPY W/LESION REMOVAL: CPT

## 2025-08-20 PROCEDURE — 45380 COLONOSCOPY AND BIOPSY: CPT | Mod: XS

## 2025-08-20 PROCEDURE — 88305 TISSUE EXAM BY PATHOLOGIST: CPT | Mod: 26

## 2025-08-20 PROCEDURE — 88305 TISSUE EXAM BY PATHOLOGIST: CPT

## 2025-08-20 PROCEDURE — 45380 COLONOSCOPY AND BIOPSY: CPT | Mod: 59

## 2025-08-20 DEVICE — NAIL OSTEO 1.5X16MM STRL: Type: IMPLANTABLE DEVICE | Status: FUNCTIONAL

## 2025-08-25 LAB — SURGICAL PATHOLOGY STUDY: SIGNIFICANT CHANGE UP

## 2025-08-28 ENCOUNTER — APPOINTMENT (OUTPATIENT)
Dept: FAMILY MEDICINE | Facility: CLINIC | Age: 70
End: 2025-08-28
Payer: MEDICARE

## 2025-08-28 VITALS — OXYGEN SATURATION: 98 % | WEIGHT: 235 LBS | BODY MASS INDEX: 35.61 KG/M2 | HEIGHT: 68 IN

## 2025-08-28 VITALS
OXYGEN SATURATION: 96 % | TEMPERATURE: 97.3 F | HEART RATE: 83 BPM | DIASTOLIC BLOOD PRESSURE: 80 MMHG | SYSTOLIC BLOOD PRESSURE: 104 MMHG

## 2025-08-28 DIAGNOSIS — Z78.9 OTHER SPECIFIED HEALTH STATUS: ICD-10-CM

## 2025-08-28 DIAGNOSIS — I10 ESSENTIAL (PRIMARY) HYPERTENSION: ICD-10-CM

## 2025-08-28 DIAGNOSIS — Z87.891 PERSONAL HISTORY OF NICOTINE DEPENDENCE: ICD-10-CM

## 2025-08-28 DIAGNOSIS — Z82.49 FAMILY HISTORY OF ISCHEMIC HEART DISEASE AND OTHER DISEASES OF THE CIRCULATORY SYSTEM: ICD-10-CM

## 2025-08-28 DIAGNOSIS — Z63.5 DISRUPTION OF FAMILY BY SEPARATION AND DIVORCE: ICD-10-CM

## 2025-08-28 DIAGNOSIS — Z80.9 FAMILY HISTORY OF MALIGNANT NEOPLASM, UNSPECIFIED: ICD-10-CM

## 2025-08-28 DIAGNOSIS — Z56.0 UNEMPLOYMENT, UNSPECIFIED: ICD-10-CM

## 2025-08-28 DIAGNOSIS — R10.9 UNSPECIFIED ABDOMINAL PAIN: ICD-10-CM

## 2025-08-28 DIAGNOSIS — G47.33 OBSTRUCTIVE SLEEP APNEA (ADULT) (PEDIATRIC): ICD-10-CM

## 2025-08-28 DIAGNOSIS — E66.3 OVERWEIGHT: ICD-10-CM

## 2025-08-28 DIAGNOSIS — N13.8 BENIGN PROSTATIC HYPERPLASIA WITH LOWER URINARY TRACT SYMPMS: ICD-10-CM

## 2025-08-28 DIAGNOSIS — Z83.3 FAMILY HISTORY OF DIABETES MELLITUS: ICD-10-CM

## 2025-08-28 DIAGNOSIS — E78.5 HYPERLIPIDEMIA, UNSPECIFIED: ICD-10-CM

## 2025-08-28 DIAGNOSIS — N40.1 BENIGN PROSTATIC HYPERPLASIA WITH LOWER URINARY TRACT SYMPMS: ICD-10-CM

## 2025-08-28 DIAGNOSIS — E55.9 VITAMIN D DEFICIENCY, UNSPECIFIED: ICD-10-CM

## 2025-08-28 DIAGNOSIS — R53.83 OTHER FATIGUE: ICD-10-CM

## 2025-08-28 DIAGNOSIS — E88.819 INSULIN RESISTANCE, UNSPECIFIED: ICD-10-CM

## 2025-08-28 DIAGNOSIS — K21.9 GASTRO-ESOPHAGEAL REFLUX DISEASE W/OUT ESOPHAGITIS: ICD-10-CM

## 2025-08-28 DIAGNOSIS — R06.02 SHORTNESS OF BREATH: ICD-10-CM

## 2025-08-28 DIAGNOSIS — Z12.5 ENCOUNTER FOR SCREENING FOR MALIGNANT NEOPLASM OF PROSTATE: ICD-10-CM

## 2025-08-28 DIAGNOSIS — R97.20 ELEVATED PROSTATE, SPECIFIC ANTIGEN [PSA]: ICD-10-CM

## 2025-08-28 PROCEDURE — G0439: CPT

## 2025-08-28 PROCEDURE — 36415 COLL VENOUS BLD VENIPUNCTURE: CPT

## 2025-08-28 SDOH — ECONOMIC STABILITY - INCOME SECURITY: UNEMPLOYMENT, UNSPECIFIED: Z56.0

## 2025-08-28 SDOH — SOCIAL STABILITY - SOCIAL INSECURITY: DISRUPTION OF FAMILY BY SEPARATION AND DIVORCE: Z63.5

## 2025-09-02 LAB
25(OH)D3 SERPL-MCNC: 31.7 NG/ML
ALBUMIN SERPL ELPH-MCNC: 4.8 G/DL
ALBUMIN, RANDOM URINE: <1.2 MG/DL
ALP BLD-CCNC: 107 U/L
ALT SERPL-CCNC: 31 U/L
ANION GAP SERPL CALC-SCNC: 14 MMOL/L
APPEARANCE: CLEAR
AST SERPL-CCNC: 23 U/L
BASOPHILS # BLD AUTO: 0.08 K/UL
BASOPHILS NFR BLD AUTO: 1.1 %
BILIRUB SERPL-MCNC: 0.7 MG/DL
BILIRUBIN URINE: NEGATIVE
BLOOD URINE: NEGATIVE
BUN SERPL-MCNC: 13 MG/DL
C PEPTIDE SERPL-MCNC: 2.7 NG/ML
CALCIUM SERPL-MCNC: 10 MG/DL
CHLORIDE SERPL-SCNC: 104 MMOL/L
CHOLEST SERPL-MCNC: 161 MG/DL
CO2 SERPL-SCNC: 23 MMOL/L
COLOR: YELLOW
CREAT SERPL-MCNC: 0.97 MG/DL
CREAT SPEC-SCNC: 133 MG/DL
EGFRCR SERPLBLD CKD-EPI 2021: 85 ML/MIN/1.73M2
EOSINOPHIL # BLD AUTO: 0.06 K/UL
EOSINOPHIL NFR BLD AUTO: 0.8 %
ESTIMATED AVERAGE GLUCOSE: 123 MG/DL
ESTROGEN SERPL-MCNC: 77 PG/ML
FERRITIN SERPL-MCNC: 609 NG/ML
FOLATE SERPL-MCNC: 10.8 NG/ML
GLUCOSE QUALITATIVE U: NEGATIVE MG/DL
GLUCOSE SERPL-MCNC: 103 MG/DL
HBA1C MFR BLD HPLC: 5.9 %
HCT VFR BLD CALC: 46.3 %
HDLC SERPL-MCNC: 53 MG/DL
HGB BLD-MCNC: 15.9 G/DL
IMM GRANULOCYTES NFR BLD AUTO: 0.8 %
IRON SATN MFR SERPL: 35 %
IRON SERPL-MCNC: 107 UG/DL
KETONES URINE: NEGATIVE MG/DL
LDLC SERPL-MCNC: 87 MG/DL
LEUKOCYTE ESTERASE URINE: NEGATIVE
LYMPHOCYTES # BLD AUTO: 0.87 K/UL
LYMPHOCYTES NFR BLD AUTO: 12 %
MAN DIFF?: NORMAL
MCHC RBC-ENTMCNC: 31.9 PG
MCHC RBC-ENTMCNC: 34.3 G/DL
MCV RBC AUTO: 93 FL
MICROALBUMIN/CREAT 24H UR-RTO: NORMAL MG/G
MONOCYTES # BLD AUTO: 0.72 K/UL
MONOCYTES NFR BLD AUTO: 9.9 %
NEUTROPHILS # BLD AUTO: 5.47 K/UL
NEUTROPHILS NFR BLD AUTO: 75.4 %
NITRITE URINE: NEGATIVE
NONHDLC SERPL-MCNC: 108 MG/DL
PH URINE: 7
PLATELET # BLD AUTO: 236 K/UL
POTASSIUM SERPL-SCNC: 4.6 MMOL/L
PROT SERPL-MCNC: 7 G/DL
PROTEIN URINE: NEGATIVE MG/DL
PSA FREE FLD-MCNC: 9 %
PSA FREE SERPL-MCNC: 0.07 NG/ML
PSA SERPL-MCNC: 0.75 NG/ML
RBC # BLD: 4.98 M/UL
RBC # FLD: 13.2 %
SODIUM SERPL-SCNC: 141 MMOL/L
SPECIFIC GRAVITY URINE: 1.02
T4 SERPL-MCNC: 8.2 UG/DL
TESTOST FREE SERPL-MCNC: 8 PG/ML
TESTOST SERPL-MCNC: 386 NG/DL
TIBC SERPL-MCNC: 301 UG/DL
TRIGL SERPL-MCNC: 120 MG/DL
TSH SERPL-ACNC: 2.78 UIU/ML
UIBC SERPL-MCNC: 194 UG/DL
UROBILINOGEN URINE: 0.2 MG/DL
VIT B12 SERPL-MCNC: 726 PG/ML
WBC # FLD AUTO: 7.26 K/UL

## 2025-09-04 LAB — URINE CULTURE <10: ABNORMAL

## (undated) DEVICE — SOL IRR BAG H2O 1000ML

## (undated) DEVICE — UNDERPAD LINEN SAVER 23 X 36"

## (undated) DEVICE — CATH IV SAFE BC 22G X 1" (BLUE)

## (undated) DEVICE — FORCEP RADIAL JAW 4 JUMBO 2.8MM 3.2MM 240CM ORANGE DISP

## (undated) DEVICE — DENTURE CUP PINK

## (undated) DEVICE — SNARE LESIONHUNTER ROTAT NITNL COLD 15MM

## (undated) DEVICE — SYR LUER SLIP TIP 50CC

## (undated) DEVICE — DRSG 2X2

## (undated) DEVICE — TUBING IV EXTENSION MACRO W CLAVE 7"

## (undated) DEVICE — ATTACHMENT DISTAL 4X13.4MM

## (undated) DEVICE — SOL BAG NS 0.9% 1000ML

## (undated) DEVICE — GOWN IMPERV XL

## (undated) DEVICE — SENSOR O2 FINGER ADULT

## (undated) DEVICE — PACK IV START WITH CHG

## (undated) DEVICE — FORCEP RADIAL JAW 4 W NDL 2.4MM 2.8MM 240CM ORANGE DISP

## (undated) DEVICE — POLY TRAP ETRAP

## (undated) DEVICE — MASK PROCEDURE EARLOOP LVL 2 50/BX

## (undated) DEVICE — TUBING ALARIS PUMP MODULE NON-DEHP

## (undated) DEVICE — SYR SLIP 10CC

## (undated) DEVICE — DRSG CURITY GAUZE SPONGE 4 X 4" 12-PLY NON-STERILE

## (undated) DEVICE — SYR IV FLUSH SALINE 10ML 30/TY